# Patient Record
Sex: FEMALE | Race: WHITE | NOT HISPANIC OR LATINO | Employment: OTHER | URBAN - NONMETROPOLITAN AREA
[De-identification: names, ages, dates, MRNs, and addresses within clinical notes are randomized per-mention and may not be internally consistent; named-entity substitution may affect disease eponyms.]

---

## 2017-01-19 ENCOUNTER — AMBULATORY - GICH (OUTPATIENT)
Dept: SCHEDULING | Facility: OTHER | Age: 67
End: 2017-01-19

## 2017-03-03 ENCOUNTER — COMMUNICATION - GICH (OUTPATIENT)
Dept: FAMILY MEDICINE | Facility: OTHER | Age: 67
End: 2017-03-03

## 2017-03-03 DIAGNOSIS — I16.0 HYPERTENSIVE URGENCY: ICD-10-CM

## 2017-03-27 ENCOUNTER — OFFICE VISIT - GICH (OUTPATIENT)
Dept: FAMILY MEDICINE | Facility: OTHER | Age: 67
End: 2017-03-27

## 2017-03-27 ENCOUNTER — HISTORY (OUTPATIENT)
Dept: FAMILY MEDICINE | Facility: OTHER | Age: 67
End: 2017-03-27

## 2017-03-27 ENCOUNTER — AMBULATORY - GICH (OUTPATIENT)
Dept: LAB | Facility: OTHER | Age: 67
End: 2017-03-27

## 2017-03-27 DIAGNOSIS — E11.9 TYPE 2 DIABETES MELLITUS WITHOUT COMPLICATIONS (H): ICD-10-CM

## 2017-03-27 DIAGNOSIS — Z78.0 ASYMPTOMATIC MENOPAUSAL STATE: ICD-10-CM

## 2017-03-27 DIAGNOSIS — I10 ESSENTIAL (PRIMARY) HYPERTENSION: ICD-10-CM

## 2017-03-27 DIAGNOSIS — E78.5 HYPERLIPIDEMIA: ICD-10-CM

## 2017-03-27 DIAGNOSIS — Z12.11 ENCOUNTER FOR SCREENING FOR MALIGNANT NEOPLASM OF COLON: ICD-10-CM

## 2017-03-27 DIAGNOSIS — E61.1 IRON DEFICIENCY: ICD-10-CM

## 2017-03-27 LAB
A/G RATIO - HISTORICAL: 2.1 (ref 1–2)
ALBUMIN SERPL-MCNC: 4.1 G/DL (ref 3.5–5.7)
ALP SERPL-CCNC: 56 IU/L (ref 34–104)
ALT (SGPT) - HISTORICAL: 15 IU/L (ref 7–52)
ANION GAP - HISTORICAL: 8 (ref 5–18)
AST SERPL-CCNC: 14 IU/L (ref 13–39)
BILIRUB SERPL-MCNC: 0.4 MG/DL (ref 0.3–1)
BUN SERPL-MCNC: 15 MG/DL (ref 7–25)
BUN/CREAT RATIO - HISTORICAL: 19
CALCIUM SERPL-MCNC: 9.4 MG/DL (ref 8.6–10.3)
CHLORIDE SERPLBLD-SCNC: 106 MMOL/L (ref 98–107)
CO2 SERPL-SCNC: 28 MMOL/L (ref 21–31)
CREAT SERPL-MCNC: 0.81 MG/DL (ref 0.7–1.3)
ESTIMATED AVERAGE GLUCOSE: 148 MG/DL
GFR IF NOT AFRICAN AMERICAN - HISTORICAL: >60 ML/MIN/1.73M2
GLOBULIN - HISTORICAL: 2 G/DL (ref 2–3.7)
GLUCOSE SERPL-MCNC: 109 MG/DL (ref 70–105)
HEMOGLOBIN A1C MONITORING (POCT) - HISTORICAL: 6.8 % (ref 4–6.2)
POTASSIUM SERPL-SCNC: 3.8 MMOL/L (ref 3.5–5.1)
PROT SERPL-MCNC: 6.1 G/DL (ref 6.4–8.9)
SODIUM SERPL-SCNC: 142 MMOL/L (ref 133–143)

## 2017-03-30 ENCOUNTER — HOSPITAL ENCOUNTER (OUTPATIENT)
Dept: RADIOLOGY | Facility: OTHER | Age: 67
End: 2017-03-30
Attending: FAMILY MEDICINE

## 2017-03-30 DIAGNOSIS — Z78.0 ASYMPTOMATIC MENOPAUSAL STATE: ICD-10-CM

## 2017-04-03 ENCOUNTER — AMBULATORY - GICH (OUTPATIENT)
Dept: SCHEDULING | Facility: OTHER | Age: 67
End: 2017-04-03

## 2017-04-03 LAB — COLOGUARD-ABSTRACT: NEGATIVE

## 2017-04-17 ENCOUNTER — COMMUNICATION - GICH (OUTPATIENT)
Dept: FAMILY MEDICINE | Facility: OTHER | Age: 67
End: 2017-04-17

## 2017-06-16 ENCOUNTER — COMMUNICATION - GICH (OUTPATIENT)
Dept: FAMILY MEDICINE | Facility: OTHER | Age: 67
End: 2017-06-16

## 2017-06-16 DIAGNOSIS — E53.8 DEFICIENCY OF OTHER SPECIFIED B GROUP VITAMINS (CODE): ICD-10-CM

## 2017-06-19 ENCOUNTER — COMMUNICATION - GICH (OUTPATIENT)
Dept: FAMILY MEDICINE | Facility: OTHER | Age: 67
End: 2017-06-19

## 2017-06-19 DIAGNOSIS — E53.8 DEFICIENCY OF OTHER SPECIFIED B GROUP VITAMINS (CODE): ICD-10-CM

## 2017-06-21 ENCOUNTER — COMMUNICATION - GICH (OUTPATIENT)
Dept: FAMILY MEDICINE | Facility: OTHER | Age: 67
End: 2017-06-21

## 2017-06-27 ENCOUNTER — AMBULATORY - GICH (OUTPATIENT)
Dept: LAB | Facility: OTHER | Age: 67
End: 2017-06-27

## 2017-06-27 ENCOUNTER — HISTORY (OUTPATIENT)
Dept: FAMILY MEDICINE | Facility: OTHER | Age: 67
End: 2017-06-27

## 2017-06-27 ENCOUNTER — OFFICE VISIT - GICH (OUTPATIENT)
Dept: FAMILY MEDICINE | Facility: OTHER | Age: 67
End: 2017-06-27

## 2017-06-27 DIAGNOSIS — E11.9 TYPE 2 DIABETES MELLITUS WITHOUT COMPLICATIONS (H): ICD-10-CM

## 2017-06-27 DIAGNOSIS — E61.1 IRON DEFICIENCY: ICD-10-CM

## 2017-06-27 DIAGNOSIS — Z00.00 ENCOUNTER FOR GENERAL ADULT MEDICAL EXAMINATION WITHOUT ABNORMAL FINDINGS: ICD-10-CM

## 2017-06-27 DIAGNOSIS — E78.5 HYPERLIPIDEMIA: ICD-10-CM

## 2017-06-27 DIAGNOSIS — L60.3 NAIL DYSTROPHY: ICD-10-CM

## 2017-06-27 DIAGNOSIS — Z12.31 ENCOUNTER FOR SCREENING MAMMOGRAM FOR MALIGNANT NEOPLASM OF BREAST: ICD-10-CM

## 2017-06-27 DIAGNOSIS — I10 ESSENTIAL (PRIMARY) HYPERTENSION: ICD-10-CM

## 2017-06-27 DIAGNOSIS — Z23 ENCOUNTER FOR IMMUNIZATION: ICD-10-CM

## 2017-06-27 LAB
A/G RATIO - HISTORICAL: 1.6 (ref 1–2)
ABSOLUTE BASOPHILS - HISTORICAL: 0 THOU/CU MM
ABSOLUTE EOSINOPHILS - HISTORICAL: 0.2 THOU/CU MM
ABSOLUTE IMMATURE GRANULOCYTES(METAS,MYELOS,PROS) - HISTORICAL: 0 THOU/CU MM
ABSOLUTE LYMPHOCYTES - HISTORICAL: 2.4 THOU/CU MM (ref 0.9–2.9)
ABSOLUTE MONOCYTES - HISTORICAL: 0.5 THOU/CU MM
ABSOLUTE NEUTROPHILS - HISTORICAL: 3.7 THOU/CU MM (ref 1.7–7)
ALBUMIN SERPL-MCNC: 4 G/DL (ref 3.5–5.7)
ALP SERPL-CCNC: 53 IU/L (ref 34–104)
ALT (SGPT) - HISTORICAL: 15 IU/L (ref 7–52)
ANION GAP - HISTORICAL: 6 (ref 5–18)
AST SERPL-CCNC: 12 IU/L (ref 13–39)
BASOPHILS # BLD AUTO: 0.6 %
BILIRUB SERPL-MCNC: 0.4 MG/DL (ref 0.3–1)
BUN SERPL-MCNC: 16 MG/DL (ref 7–25)
BUN/CREAT RATIO - HISTORICAL: 16
CALCIUM SERPL-MCNC: 9.2 MG/DL (ref 8.6–10.3)
CHLORIDE SERPLBLD-SCNC: 107 MMOL/L (ref 98–107)
CHOL/HDL RATIO - HISTORICAL: 2.22
CHOLESTEROL TOTAL: 164 MG/DL
CO2 SERPL-SCNC: 28 MMOL/L (ref 21–31)
CREAT SERPL-MCNC: 1 MG/DL (ref 0.7–1.3)
EOSINOPHIL NFR BLD AUTO: 2.8 %
ERYTHROCYTE [DISTWIDTH] IN BLOOD BY AUTOMATED COUNT: 12.3 % (ref 11.5–15.5)
ESTIMATED AVERAGE GLUCOSE: 128 MG/DL
FERRITIN SERPL-MCNC: 5.7 NG/ML (ref 23.9–336.2)
GFR IF NOT AFRICAN AMERICAN - HISTORICAL: 55 ML/MIN/1.73M2
GLOBULIN - HISTORICAL: 2.5 G/DL (ref 2–3.7)
GLUCOSE SERPL-MCNC: 107 MG/DL (ref 70–105)
HCT VFR BLD AUTO: 39.5 % (ref 33–51)
HDLC SERPL-MCNC: 74 MG/DL (ref 23–92)
HEMOGLOBIN A1C MONITORING (POCT) - HISTORICAL: 6.1 % (ref 4–6.2)
HEMOGLOBIN: 13.2 G/DL (ref 12–16)
IMMATURE GRANULOCYTES(METAS,MYELOS,PROS) - HISTORICAL: 0.1 %
LDLC SERPL CALC-MCNC: 69 MG/DL
LYMPHOCYTES NFR BLD AUTO: 35.4 % (ref 20–44)
MCH RBC QN AUTO: 30.9 PG (ref 26–34)
MCHC RBC AUTO-ENTMCNC: 33.4 G/DL (ref 32–36)
MCV RBC AUTO: 93 FL (ref 80–100)
MONOCYTES NFR BLD AUTO: 7.4 %
NEUTROPHILS NFR BLD AUTO: 53.7 % (ref 42–72)
NON-HDL CHOLESTEROL - HISTORICAL: 90 MG/DL
PATIENT STATUS - HISTORICAL: NORMAL
PLATELET # BLD AUTO: 297 THOU/CU MM (ref 140–440)
PMV BLD: 9.8 FL (ref 6.5–11)
POTASSIUM SERPL-SCNC: 4.1 MMOL/L (ref 3.5–5.1)
PROT SERPL-MCNC: 6.5 G/DL (ref 6.4–8.9)
RED BLOOD COUNT - HISTORICAL: 4.27 MIL/CU MM (ref 4–5.2)
SODIUM SERPL-SCNC: 141 MMOL/L (ref 133–143)
TRIGL SERPL-MCNC: 104 MG/DL
TSH - HISTORICAL: 4.99 UIU/ML (ref 0.34–5.6)
WHITE BLOOD COUNT - HISTORICAL: 6.9 THOU/CU MM (ref 4.5–11)

## 2017-06-28 ENCOUNTER — AMBULATORY - GICH (OUTPATIENT)
Dept: FAMILY MEDICINE | Facility: OTHER | Age: 67
End: 2017-06-28

## 2017-07-07 ENCOUNTER — COMMUNICATION - GICH (OUTPATIENT)
Dept: FAMILY MEDICINE | Facility: OTHER | Age: 67
End: 2017-07-07

## 2017-07-07 DIAGNOSIS — E11.9 TYPE 2 DIABETES MELLITUS WITHOUT COMPLICATIONS (H): ICD-10-CM

## 2017-08-24 ENCOUNTER — COMMUNICATION - GICH (OUTPATIENT)
Dept: FAMILY MEDICINE | Facility: OTHER | Age: 67
End: 2017-08-24

## 2017-08-24 DIAGNOSIS — J30.1 ALLERGIC RHINITIS DUE TO POLLEN: ICD-10-CM

## 2017-09-29 ENCOUNTER — HOSPITAL ENCOUNTER (OUTPATIENT)
Dept: RADIOLOGY | Facility: OTHER | Age: 67
End: 2017-09-29
Attending: FAMILY MEDICINE

## 2017-09-29 ENCOUNTER — AMBULATORY - GICH (OUTPATIENT)
Dept: LAB | Facility: OTHER | Age: 67
End: 2017-09-29

## 2017-09-29 ENCOUNTER — OFFICE VISIT - GICH (OUTPATIENT)
Dept: FAMILY MEDICINE | Facility: OTHER | Age: 67
End: 2017-09-29

## 2017-09-29 ENCOUNTER — HISTORY (OUTPATIENT)
Dept: FAMILY MEDICINE | Facility: OTHER | Age: 67
End: 2017-09-29

## 2017-09-29 ENCOUNTER — HISTORY (OUTPATIENT)
Dept: RADIOLOGY | Facility: OTHER | Age: 67
End: 2017-09-29

## 2017-09-29 DIAGNOSIS — E78.5 HYPERLIPIDEMIA: ICD-10-CM

## 2017-09-29 DIAGNOSIS — E11.9 TYPE 2 DIABETES MELLITUS WITHOUT COMPLICATIONS (H): ICD-10-CM

## 2017-09-29 DIAGNOSIS — I10 ESSENTIAL (PRIMARY) HYPERTENSION: ICD-10-CM

## 2017-09-29 DIAGNOSIS — Z12.31 ENCOUNTER FOR SCREENING MAMMOGRAM FOR MALIGNANT NEOPLASM OF BREAST: ICD-10-CM

## 2017-09-29 DIAGNOSIS — Z23 ENCOUNTER FOR IMMUNIZATION: ICD-10-CM

## 2017-09-29 LAB
A/G RATIO - HISTORICAL: 1.5 (ref 1–2)
ALBUMIN SERPL-MCNC: 4 G/DL (ref 3.5–5.7)
ALP SERPL-CCNC: 54 IU/L (ref 34–104)
ALT (SGPT) - HISTORICAL: 21 IU/L (ref 7–52)
ANION GAP - HISTORICAL: 10 (ref 5–18)
AST SERPL-CCNC: 17 IU/L (ref 13–39)
BILIRUB SERPL-MCNC: 0.5 MG/DL (ref 0.3–1)
BUN SERPL-MCNC: 15 MG/DL (ref 7–25)
BUN/CREAT RATIO - HISTORICAL: 16
CALCIUM SERPL-MCNC: 9.5 MG/DL (ref 8.6–10.3)
CHLORIDE SERPLBLD-SCNC: 104 MMOL/L (ref 98–107)
CO2 SERPL-SCNC: 29 MMOL/L (ref 21–31)
CREAT SERPL-MCNC: 0.96 MG/DL (ref 0.7–1.3)
ESTIMATED AVERAGE GLUCOSE: 126 MG/DL
GFR IF NOT AFRICAN AMERICAN - HISTORICAL: 58 ML/MIN/1.73M2
GLOBULIN - HISTORICAL: 2.7 G/DL (ref 2–3.7)
GLUCOSE SERPL-MCNC: 110 MG/DL (ref 70–105)
HEMOGLOBIN A1C MONITORING (POCT) - HISTORICAL: 6 % (ref 4–6.2)
POTASSIUM SERPL-SCNC: 4.2 MMOL/L (ref 3.5–5.1)
PROT SERPL-MCNC: 6.7 G/DL (ref 6.4–8.9)
SODIUM SERPL-SCNC: 143 MMOL/L (ref 133–143)

## 2017-10-02 ENCOUNTER — AMBULATORY - GICH (OUTPATIENT)
Dept: RADIOLOGY | Facility: OTHER | Age: 67
End: 2017-10-02

## 2017-10-02 DIAGNOSIS — R92.8 OTHER ABNORMAL AND INCONCLUSIVE FINDINGS ON DIAGNOSTIC IMAGING OF BREAST: ICD-10-CM

## 2017-10-05 ENCOUNTER — COMMUNICATION - GICH (OUTPATIENT)
Dept: FAMILY MEDICINE | Facility: OTHER | Age: 67
End: 2017-10-05

## 2017-10-05 DIAGNOSIS — E11.9 TYPE 2 DIABETES MELLITUS WITHOUT COMPLICATIONS (H): ICD-10-CM

## 2017-10-06 ENCOUNTER — HOSPITAL ENCOUNTER (OUTPATIENT)
Dept: RADIOLOGY | Facility: OTHER | Age: 67
End: 2017-10-06
Attending: FAMILY MEDICINE

## 2017-10-06 ENCOUNTER — HISTORY (OUTPATIENT)
Dept: RADIOLOGY | Facility: OTHER | Age: 67
End: 2017-10-06

## 2017-10-06 DIAGNOSIS — R92.8 OTHER ABNORMAL AND INCONCLUSIVE FINDINGS ON DIAGNOSTIC IMAGING OF BREAST: ICD-10-CM

## 2017-11-26 ENCOUNTER — COMMUNICATION - GICH (OUTPATIENT)
Dept: FAMILY MEDICINE | Facility: OTHER | Age: 67
End: 2017-11-26

## 2017-11-26 DIAGNOSIS — E53.8 DEFICIENCY OF OTHER SPECIFIED B GROUP VITAMINS (CODE): ICD-10-CM

## 2017-12-09 ENCOUNTER — COMMUNICATION - GICH (OUTPATIENT)
Dept: FAMILY MEDICINE | Facility: OTHER | Age: 67
End: 2017-12-09

## 2017-12-09 DIAGNOSIS — E11.9 TYPE 2 DIABETES MELLITUS WITHOUT COMPLICATIONS (H): ICD-10-CM

## 2017-12-09 DIAGNOSIS — I10 ESSENTIAL (PRIMARY) HYPERTENSION: ICD-10-CM

## 2017-12-27 NOTE — PROGRESS NOTES
Patient Information     Patient Name MRN Sex Karen Conde [id number] Female 1950      Progress Notes by Luz Levin MD at 2017  8:30 AM     Author:  Luz Levin MD Service:  (none) Author Type:  Physician     Filed:  2017 12:30 PM Encounter Date:  2017 Status:  Signed     :  Luz Levin MD (Physician)            SUBJECTIVE:    Karen Vargas is a 67 y.o. female who presents for a diabetic check.    Diabetes   She presents for her initial diabetic visit. She has type 2 diabetes mellitus. MedicAlert identification noted. Her disease course has been stable. There are no hypoglycemic associated symptoms. Pertinent negatives for diabetes include no blurred vision, no chest pain, no fatigue, no foot paresthesias, no foot ulcerations, no polydipsia, no polyphagia, no polyuria, no visual change, no weakness and no weight loss. There are no hypoglycemic complications. Pertinent negatives for diabetic complications include no autonomic neuropathy, CVA, heart disease, impotence, nephropathy, peripheral neuropathy, PVD or retinopathy. Risk factors for coronary artery disease include diabetes mellitus, dyslipidemia, hypertension, obesity and post-menopausal. Current diabetic treatment includes oral agent (dual therapy). She is compliant with treatment most of the time. Her weight is stable. She is following a generally healthy diet. When asked about meal planning, she reported none. She has not had a previous visit with a dietitian. She participates in exercise daily. An ACE inhibitor/angiotensin II receptor blocker is being taken. She does not see a podiatrist.Eye exam is current (17).     I personally reviewed medications/allergies/history listed below:      Allergies     Allergen  Reactions     Ceclor [Cefaclor] Rash   ,   Family History       Problem   Relation Age of Onset     Cancer  Father      lung cancer ,        Heart Disease  Mother       "Coronary artery disease,        Cancer-colon  Mother      bladder cancer, colon cancer, metastatic to liver, colon cancer        Cancer-breast  Sister      metastatic breast cancer at age 63       Cancer  Sister      one  of uterine cancer age 48        Cancer  Sister      one  of bone cancer age 46.       Cancer  Brother      had brain tumors, ultimately committed suicide        Cancer  Sister      lung cancer     ,   Current Outpatient Prescriptions on File Prior to Visit       Medication  Sig Dispense Refill     aspirin enteric coated 81 mg tablet Take 1 tablet by mouth once daily with a meal.  0     blood sugar diagnostic (ACCU-CHEK DEE PLUS TEST STRP) strip Dispense test strips covered by the patient insurance. Test 2 time per day.  ICD E11.9 200 Strip 4     Blood-Glucose Meter (ACCU-CHEK DEE PLUS METER) Dispense glucose meter, test strips and lancets covered by the patient insurance. Test 1 time per day.  Diagnosis 250.0. 1 Device 0     blood-glucose meter Accu chek Dee Plus meter.  E11.9 NIDDM type II - Test 1 time/day. 1 Device 0     cyanocobalamin (VITAMIN B12) 1,000 mcg/mL injection INJECT 1ML INTRAMUSCULAR EVERY 4 WEEKS. 3 mL 3     ferrous gluconate 325 mg (37 mg iron) tablet Take 1 tablet by mouth once daily with a meal.  0     glucosamine sulfate 1,000 mg cap Take 2 capsules by mouth once daily.  0     Insulin Needles, Disposable, (BD INSULIN PEN NEEDLE UF MINI) 31 gauge x 3/16\" Test blood sugars twice daily ICD E11.9. 200 Each 3     lancets (ACCU-CHEK SOFTCLIX LANCETS) Test 1 times per day.  Diagnosis 250.0. 100 Each 3     liraglutide (VICTOZA 3-JOSE MIGUEL) 0.6 mg/0.1 mL (18 mg/3 mL) injection Start with 0.6 mg subcutaneous daily x 1 week, then increase to 1.2 mg subcutaneous daily. 3 mL 11     lisinopril (PRINIVIL; ZESTRIL) 2.5 mg tablet Take 1 tablet by mouth once daily. 90 tablet 3     loratadine (CLARITIN) 10 mg tablet TAKE ONE TABLET BY MOUTH ONE TIME DAILY 90 tablet 2     metFORMIN " (GLUCOPHAGE) 1,000 mg tablet Take 1 tablet by mouth 2 times daily with meals. 180 tablet 3     multivitamin (MVI) tablet Take 1 tablet by mouth once daily.  0     omega-3 fatty acids-vitamin E (FISH OIL) 1,000 mg cap Take 1 capsule by mouth once daily.  0     ONE TOUCH ULTRA TEST strip TEST TWICE DAILY 200 Each 3     triamterene-hydrochlorothiazide, 37.5-25 mg, (MAXZIDE-25) 37.5-25 mg tablet TAKE HALF TABLET BY MOUTH DAILY 45 tablet 3     No current facility-administered medications on file prior to visit.    ,   Past Medical History:     Diagnosis  Date     Basal cell carcinoma of nose 2/25/2015     Diabetes mellitus (HC)      History of pregnancy     G2, P2-0-0-2      Intentional weight loss     History of morbid obesity, weight loss on byetta    ,   Patient Active Problem List     Diagnosis  Code     ALLERGIC RHINITIS, SEASONAL J30.1     DIABETES MELLITUS, TYPE II E11.9     HYPERTENSION I10     HYPERLIPIDEMIA E78.5     OVER WEIGHT E66.9     HIP PAIN, RIGH,T SI JOINT DYSFUNCTION M25.559    and   Past Surgical History:      Procedure  Laterality Date     CARPAL TUNNEL RELEASE  11/2000, 12/2000    Laparoscopic carpal tunnel release right hand, left hand       CATARACT EXTRACTION W/  INTRAOCULAR LENS IMPLANT Left 6/15/16     COLONOSCOPY  12/6/2006    10 year f/u        GASTRIC BYPASS      Silvestre-en-Y gastric bypass with incidental cholecystectomy and umbilical hernia repair       HERNIA REPAIR       umbilical hernia repair       KNEE ARTHROSCOPY Left 2008     LAP CHOLECYSTECTOMY       REFRACTIVE SURGERY  07/2000    LASIK refractive eye surgery 7/00       Social History     Social History        Marital status:       Spouse name: N/A     Number of children:  N/A     Years of education:  N/A     Occupational History      Not on file.     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     - Georgi - metastatic pancreatic cancer,  2012.    Peña 72 son    Tsering 10/4/74 daughter    grandson - Barry             REVIEW OF SYSTEMS:  Review of Systems   Constitutional: Negative for fatigue and weight loss.   Eyes: Negative for blurred vision.   Cardiovascular: Negative for chest pain.   Genitourinary: Negative for impotence.   Neurological: Negative for weakness.   Endo/Heme/Allergies: Negative for polydipsia and polyphagia.   Psychiatric/Behavioral: Negative for depression.   All other systems reviewed and are negative.      OBJECTIVE:  /76  Pulse 63  Temp 97.3  F (36.3  C) (Tympanic)  Wt 78.7 kg (173 lb 6.4 oz)  Breastfeeding? No  BMI 32.53 kg/m2    EXAM:   Physical Exam   Constitutional: She is well-developed, well-nourished, and in no distress. No distress.   HENT:   Head: Normocephalic and atraumatic.   Eyes: Pupils are equal, round, and reactive to light.   Neck: Normal range of motion. Neck supple. No thyromegaly present.   Cardiovascular: Normal rate, regular rhythm, normal heart sounds and intact distal pulses.  Exam reveals no gallop and no friction rub.    No murmur heard.  Pulmonary/Chest: Effort normal and breath sounds normal. No respiratory distress. She has no wheezes. She has no rales.   Lymphadenopathy:     She has no cervical adenopathy.   Neurological:   Normal monofilament exam in feet bilaterally.   Skin: Skin is warm and dry.   No lesions on feet.   Psychiatric:   PHQ Depression Screen  Date of PHQ exam: 17  Over the last 2 weeks, how often have you been bothered by any of the  following problems?  1. Little interest or pleasure in doing things: 0 - Not at all  2. Feeling down, depressed, or hopeless: 0 - Not at all                                              I personally reviewed results with patient as listed below:     Results for orders placed or performed in visit on 09/29/17      Hgb A1c      Result  Value Ref Range    HEMOGLOBIN A1C MONITORING (POCT) 6.0 4.0 - 6.2 %    ESTIMATED AVERAGE GLUCOSE  126 mg/dL   COMP METABOLIC PANEL      Result  Value Ref Range    SODIUM 143 133 - 143 mmol/L    POTASSIUM 4.2 3.5 - 5.1 mmol/L    CHLORIDE 104 98 - 107 mmol/L    CO2,TOTAL 29 21 - 31 mmol/L    ANION GAP 10 5 - 18                    GLUCOSE 110 (H) 70 - 105 mg/dL    CALCIUM 9.5 8.6 - 10.3 mg/dL    BUN 15 7 - 25 mg/dL    CREATININE 0.96 0.70 - 1.30 mg/dL    BUN/CREAT RATIO           16                    GFR if African American >60 >60 ml/min/1.73m2    GFR if not African American 58 (L) >60 ml/min/1.73m2    ALBUMIN 4.0 3.5 - 5.7 g/dL    PROTEIN,TOTAL 6.7 6.4 - 8.9 g/dL    GLOBULIN                  2.7 2.0 - 3.7 g/dL    A/G RATIO 1.5 1.0 - 2.0                    BILIRUBIN,TOTAL 0.5 0.3 - 1.0 mg/dL    ALK PHOSPHATASE 54 34 - 104 IU/L    ALT (SGPT) 21 7 - 52 IU/L    AST (SGOT) 17 13 - 39 IU/L        ASSESSMENT/PLAN:    ICD-10-CM    1. DIABETES MELLITUS, TYPE II E11.9    2. HYPERTENSION I10    3. Hyperlipidemia, unspecified hyperlipidemia type E78.5    4. Needs flu shot Z23 FLU VACCINE => 65 YRS HIGH DOSE TRIVALENT IIV3 IM      SC ADMIN VACC INITIAL        Plan:    1.  Stable.  A1c in good range.  Blood pressure is well controlled. Eye exam is up to date.  On aspirin daily.  Non-smoker.  On ACE-I. she has been on a statin, but would like to get off of it. Her last lipid profile was in very good range. She will go off of this with the time being and recheck lipids again in 6 months. Since her A1c is so well controlled, could also consider trying to go off of the Victoza. She is going to use at the  supply that she has and then try going off of it. She anticipates about 2 months more of taking this.  2. Blood pressures also very well controlled. She is going to try going off of metoprolol and will monitor blood pressures to see how they run. If rebounding up above 140/90 we'll need to restart again.  3. See #1.  4. Flu shot updated today.    Patient Instructions   You may try going off of simvastatin and metoprolol.    Once you have run out of the victoza, you may try going off of that as well.     Luz Levin MD

## 2017-12-27 NOTE — PROGRESS NOTES
Patient Information     Patient Name MRN Sex Karen Conde [id number] Female 1950      Progress Notes by Jeannine Alex R.T. ([facility]) at 10/6/2017 11:24 AM     Author:  [facility]ann[facility]T. [facility] Service:  (none) Author Type:  [employer] - Registered Radiologic Technologist     Filed:  10/6/2017 11:24 AM Date of Service:  10/6/2017 11:24 AM Status:  Signed     :  Jeannine Alex R.T. (ARRT) ([facility] - Registered Radiologic Technologist)            Falls Risk Criteria:    Age 65 and older or under age 4        Sensory deficits    Poor vision    Use of ambulatory aides    Impaired judgment    Unable to walk independently    Meets High Risk criteria for falls:  Yes               1.  Do you have dizziness or vertigo?    no                    2.  Do you need help standing or walking?   no                 3.  Have you fallen within the last 6 months?    no           4.  Has the patient been fasting?      no       If any risks are marked Yes, the following interventions are utilized:    Do not leave patient unattended     Assist patient in the dressing room and bathroom    Have ambulatory aides available throughout procedure    Involve patient s family if available

## 2017-12-28 NOTE — TELEPHONE ENCOUNTER
Patient Information     Patient Name MRN Sex Karen Conde [id number] Female 1950      Telephone Encounter by Luz Levin MD at 2017 12:11 PM     Author:  Luz Levin MD Service:  (none) Author Type:  Physician     Filed:  2017 12:12 PM Encounter Date:  2017 Status:  Signed     :  Luz Levin MD (Physician)            Please arrange to have records sent.  Luz Levin MD ....................  2017   12:11 PM

## 2017-12-28 NOTE — PROGRESS NOTES
Patient Information     Patient Name MRN Sex Karen Conde [id number] Female 1950      Progress Notes by Harleen Wakefield at 10/6/2017 10:18 AM     Author:  Harleen Wakefield Service:  (none) Author Type:  (none)     Filed:  10/6/2017 10:18 AM Date of Service:  10/6/2017 10:18 AM Status:  Signed     :  Harleen Wakefield            Falls Risk Criteria:    Age 65 and older or under age 4        Sensory deficits    Poor vision    Use of ambulatory aides    Impaired judgment    Unable to walk independently    Meets High Risk criteria for falls:  Yes               1.  Do you have dizziness or vertigo?    no                    2.  Do you need help standing or walking?   no                 3.  Have you fallen within the last 6 months?    no           4.  Has the patient been fasting?      No                 If any risks are marked Yes, the following interventions are utilized:    Do not leave patient unattended     Assist patient in the dressing room and bathroom    Have ambulatory aides available throughout procedure    Involve patient s family if available

## 2017-12-28 NOTE — TELEPHONE ENCOUNTER
Patient Information     Patient Name MRN Sex Karen Conde [id number] Female 1950      Telephone Encounter by Kendra Romero RN at 7/10/2017  2:51 PM     Author:  Kendra Romero RN Service:  (none) Author Type:  NURS- Registered Nurse     Filed:  7/10/2017  2:53 PM Encounter Date:  2017 Status:  Signed     :  Kendra Romero RN (NURS- Registered Nurse)            Refill request inappropriate. Filled 17 180 x 3 . Pharmacy alerted. Unable to complete prescription refill per RN Medication Refill Policy.................... Kendra Romero RN ....................  7/10/2017   2:52 PM    Prescribing Provider: Luz Marti MD            Order Date: 2017  Ordered by: LUZ MARTI  Medication:metFORMIN (GLUCOPHAGE) 1,000 mg tablet    Qty:180 tablet  Ref:3  Start:2017   End:              Route:Oral                  CHANTEL:No   Class:eRx    Sig:Take 1 tablet by mouth 2 times daily with meals.    Pharmacy:[facility] [id number] [place] [facility] [place], [place] [address]

## 2017-12-28 NOTE — TELEPHONE ENCOUNTER
Patient Information     Patient Name MRN Sex Karen Conde [id number] Female 1950      Telephone Encounter by Kendra Romero RN at 2017 11:49 AM     Author:  Kendra Romero RN Service:  (none) Author Type:  NURS- Registered Nurse     Filed:  2017 11:54 AM Encounter Date:  2017 Status:  Signed     :  Kendra Romero RN (NURS- Registered Nurse)            Office visit in the past 12 months or per provider note.  Last visit with JAIME MARTI was on: 2017 in [facility]  Next visit with JAIME MARTI is on: 2017 in [facility]  Max refill for 12 months from last office visit or per provider note.  Prescription refilled per RN Medication Refill Policy.................... Kendra Romero RN ....................  2017   11:54 AM

## 2017-12-28 NOTE — PROGRESS NOTES
Patient Information     Patient Name MRN Sex Karen Conde [id number] Female 1950      Progress Notes by Raya Chaney at 2017  9:34 AM     Author:  Raya Chaney Service:  (none) Author Type:  Other Clinical Staff     Filed:  2017  9:34 AM Date of Service:  2017  9:34 AM Status:  Signed     :  Raya Chaney (Other Clinical Staff)            Falls Risk Criteria:    Age 65 and older or under age 4        Sensory deficits    Poor vision    Use of ambulatory aides    Impaired judgment    Unable to walk independently    Meets High Risk criteria for falls:  Yes             1.  Do you have dizziness or vertigo?    no                    2.  Do you need help standing or walking?   no                 3.  Have you fallen within the last 6 months?    no           4.  Has the patient been fasting?      no       If any risks are marked Yes, the following interventions are utilized:    Do not leave patient unattended     Assist patient in the dressing room and bathroom    Have ambulatory aides available throughout procedure    Involve patient s family if available

## 2017-12-28 NOTE — TELEPHONE ENCOUNTER
Patient Information     Patient Name MRN Karen Bruce [id number] Female 1950      Telephone Encounter by Katie Grimaldo at 2017  8:21 AM     Author:  Katie Grimaldo Service:  (none) Author Type:  (none)     Filed:  2017  8:22 AM Encounter Date:  2017 Status:  Signed     :  Katie Grimaldo            This patient is coming in for lab on 17. Her orders will  on 17, could you please extend those so they will not be  when she comes?  Thank you

## 2017-12-28 NOTE — PATIENT INSTRUCTIONS
Patient Information     Patient Name MRN Karen Bruce [id number] Female 1950      Patient Instructions by Luz Levin MD at 2017  8:30 AM     Author:  Luz Levin MD Service:  (none) Author Type:  Physician     Filed:  2017  9:05 AM Encounter Date:  2017 Status:  Signed     :  Luz Levin MD (Physician)            You may try going off of simvastatin and metoprolol.    Once you have run out of the victoza, you may try going off of that as well.

## 2017-12-28 NOTE — TELEPHONE ENCOUNTER
Patient Information     Patient Name MRN Sex Karen Conde [id number] Female 1950      Telephone Encounter by Kendra Romero RN at 2017  8:49 AM     Author:  Kendra Romero RN Service:  (none) Author Type:  NURS- Registered Nurse     Filed:  2017  8:55 AM Encounter Date:  2017 Status:  Signed     :  Kendra Romero RN (NURS- Registered Nurse)            Antihistamines:  Office visit in the past 12 months.  Last visit with JAIME MARTI was on: 2017 in [facility]  Next visit with JAIME MARTI is on: 2017 in [facility]  Next visit with Family Practice is on: 2017 in [facility]  Max refills 12 months from last office visit.  Prescription refilled per RN Medication Refill Policy.................... Kendra Romero RN ....................  2017   8:51 AM

## 2017-12-28 NOTE — TELEPHONE ENCOUNTER
Patient Information     Patient Name MRN Sex Karen Conde [id number] Female 1950      Telephone Encounter by Kendra Romero RN at 10/6/2017 11:44 AM     Author:  Kendra Romero RN Service:  (none) Author Type:  NURS- Registered Nurse     Filed:  10/6/2017 11:46 AM Encounter Date:  10/5/2017 Status:  Signed     :  Kendra Romero RN (NURS- Registered Nurse)            Filled 17 #180 x 3. Pharmacy alerted. Unable to complete prescription refill per RN Medication Refill Policy.................... Kendra Romero RN ....................  10/6/2017   11:45 AM    Prescribing Provider: Luz Marti MD             Order Date: 2017  Ordered by: LUZ MARTI  Medication:metFORMIN (GLUCOPHAGE) 1,000 mg tablet    Qty:180 tablet  Ref:3  Start:2017   End:              Route:Oral                  CHANTEL:No   Class:eRx    Sig:Take 1 tablet by mouth 2 times daily with meals.    Pharmacy:[facility] [id number] [place] [facility] [place] [address]

## 2017-12-28 NOTE — TELEPHONE ENCOUNTER
Patient Information     Patient Name MRN Sex Karen Conde [id number] Female 1950      Telephone Encounter by Kendra Romero RN at 2017 12:25 PM     Author:  Kendra Romero RN Service:  (none) Author Type:  NURS- Registered Nurse     Filed:  2017 12:30 PM Encounter Date:  2017 Status:  Signed     :  Kendra Romero RN (NURS- Registered Nurse)            Refill inappropriate. Filled 16 3 ml x 3. Due 17. Pharmacy alerted. Unable to complete prescription refill per RN Medication Refill Policy.................... Kendra Romero RN ....................  2017   12:29 PM    Prescribing Provider: Luz Marti MD             Order Date: 2016  Ordered by: GEENA GRIJALVA  Medication:cyanocobalamin (VITAMIN B12) 1,000 mcg/mL injection  Prescription #:[id number]    Qty:3 mL        Ref:3  Start:2016   End:              Route:Intra-Muscular        CHANTEL:No   Class:eRx    Sig:INJECT 1MLINTRAMUSCULAR EVERY 4 WEEKS.    Pharmacy:[facility] [id number] [place] [address]    Cosign accepted by LUZ MARTI[[id number]] on 2016  8:17 AM

## 2017-12-28 NOTE — TELEPHONE ENCOUNTER
Patient Information     Patient Name MRN Sex Karen Conde [id number] Female 1950      Telephone Encounter by Luz Levin MD at 2017 12:45 PM     Author:  Luz Levin MD Service:  (none) Author Type:  Physician     Filed:  2017 12:45 PM Encounter Date:  2017 Status:  Signed     :  Luz Levin MD (Physician)            Labs extended.  Luz Levin MD ....................  2017   12:45 PM

## 2017-12-28 NOTE — PROGRESS NOTES
Patient Information     Patient Name MRN Sex Karen Conde [id number] Female 1950      Progress Notes by Luz Levin MD at 2017  8:15 AM     Author:  Luz Levin MD Service:  (none) Author Type:  Physician     Filed:  2017 10:00 AM Encounter Date:  2017 Status:  Signed     :  Luz Levin MD (Physician)            SUBJECTIVE:    Karen Vargas is a 66 y.o. female who presents for a diabetic check and follow up of chronic health issues.    Diabetes   She presents for her follow-up diabetic visit. She has type 2 diabetes mellitus. No MedicAlert identification noted. Her disease course has been stable. There are no hypoglycemic associated symptoms. Associated symptoms include weight loss. Pertinent negatives for diabetes include no chest pain. There are no hypoglycemic complications. There are no diabetic complications. Risk factors for coronary artery disease include diabetes mellitus, dyslipidemia, hypertension, obesity and post-menopausal. Current diabetic treatment includes diet and oral agent (dual therapy). She is compliant with treatment most of the time. Her weight is decreasing steadily. She is following a generally healthy diet. When asked about meal planning, she reported none. She has not had a previous visit with a dietitian. Exercise: walking at least 5 days per week. Her home blood glucose trend is decreasing steadily. (Fastin-99.) An ACE inhibitor/angiotensin II receptor blocker is being taken. She does not see a podiatrist.Eye exam is current (17).     I personally reviewed medications/allergies/history listed below:      Allergies     Allergen  Reactions     Ceclor [Cefaclor] Rash   ,   Family History       Problem   Relation Age of Onset     Cancer  Father      lung cancer ,        Heart Disease  Mother      Coronary artery disease,        Cancer-colon  Mother      bladder cancer, colon cancer, metastatic to  "liver, colon cancer        Cancer-breast  Sister      metastatic breast cancer at age 63       Cancer  Sister      one  of uterine cancer age 48        Cancer  Sister      one  of bone cancer age 46.       Cancer  Brother      had brain tumors, ultimately committed suicide        Cancer  Sister      lung cancer     ,   Current Outpatient Prescriptions on File Prior to Visit       Medication  Sig Dispense Refill     aspirin enteric coated 81 mg tablet Take 1 tablet by mouth once daily with a meal.  0     blood sugar diagnostic (ACCU-CHEK DEE PLUS TEST STRP) strip Dispense test strips covered by the patient insurance. Test 2 time per day.  ICD E11.9 200 Strip 4     Blood-Glucose Meter (ACCU-CHEK DEE PLUS METER) Dispense glucose meter, test strips and lancets covered by the patient insurance. Test 1 time per day.  Diagnosis 250.0. 1 Device 0     blood-glucose meter Accu chek Dee Plus meter.  E11.9 NIDDM type II - Test 1 time/day. 1 Device 0     cyanocobalamin (VITAMIN B12) 1,000 mcg/mL injection INJECT 1ML INTRAMUSCULAR EVERY 4 WEEKS. 3 mL 3     ferrous gluconate 325 mg (37 mg iron) tablet Take 1 tablet by mouth once daily with a meal.  0     glucosamine sulfate 1,000 mg cap Take 2 capsules by mouth once daily.  0     Insulin Needles, Disposable, (BD INSULIN PEN NEEDLE UF MINI) 31 gauge x 3/16\" Test blood sugars twice daily ICD E11.9. 200 Each 3     lancets (ACCU-CHEK SOFTCLIX LANCETS) Test 1 times per day.  Diagnosis 250.0. 100 Each 3     liraglutide (VICTOZA 3-JOSE MIGUEL) 0.6 mg/0.1 mL (18 mg/3 mL) injection Start with 0.6 mg subcutaneous daily x 1 week, then increase to 1.2 mg subcutaneous daily. 3 mL 11     lisinopril (PRINIVIL; ZESTRIL) 2.5 mg tablet Take 1 tablet by mouth once daily. 90 tablet 3     loratadine (CLARITIN) 10 mg tablet TAKE ONE TABLET BY MOUTH ONE TIME DAILY 90 tablet 3     multivitamin (MVI) tablet Take 1 tablet by mouth once daily.  0     omega-3 fatty acids-vitamin E (FISH OIL) 1,000 mg " cap Take 1 capsule by mouth once daily.  0     ONE TOUCH ULTRA TEST strip TEST TWICE DAILY 200 Each 3     triamterene-hydrochlorothiazide, 37.5-25 mg, (MAXZIDE-25) 37.5-25 mg tablet TAKE HALF TABLET BY MOUTH DAILY 45 tablet 3     No current facility-administered medications on file prior to visit.    ,   Past Medical History:     Diagnosis  Date     Basal cell carcinoma of nose 2/25/2015     Diabetes mellitus (HC)      History of pregnancy     G2, P2-0-0-2      Intentional weight loss     History of morbid obesity, weight loss on byetta    ,   Patient Active Problem List     Diagnosis  Code     ALLERGIC RHINITIS, SEASONAL J30.1     DIABETES MELLITUS, TYPE II E11.9     HYPERTENSION I10     HYPERLIPIDEMIA E78.5     OVER WEIGHT E66.9     HIP PAIN, RIGH,T SI JOINT DYSFUNCTION M25.559    and   Past Surgical History:      Procedure  Laterality Date     CARPAL TUNNEL RELEASE  11/2000, 12/2000    Laparoscopic carpal tunnel release right hand, left hand       CATARACT EXTRACTION W/  INTRAOCULAR LENS IMPLANT Left 6/15/16     COLONOSCOPY  12/6/2006    10 year f/u        GASTRIC BYPASS      Silvestre-en-Y gastric bypass with incidental cholecystectomy and umbilical hernia repair       HERNIA REPAIR       umbilical hernia repair       KNEE ARTHROSCOPY Left 2008     LAP CHOLECYSTECTOMY       REFRACTIVE SURGERY  07/2000    LASIK refractive eye surgery 7/00       Social History     Social History        Marital status:       Spouse name: N/A     Number of children:  N/A     Years of education:  N/A     Occupational History      Not on file.     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     - Georgi - metastatic pancreatic cancer,  2012.    Peña 72 son    Tsering 10/4/74 daughter    grandson - Barry             REVIEW OF SYSTEMS:  Review of Systems   Constitutional: Positive for weight loss. Negative for chills and fever.   Cardiovascular: Negative for chest pain and leg swelling.   Skin:        Left great toenail is onychomycotic.  It gets very thick, causes pain at times.  She would like to have it permanently removed if possible.   Psychiatric/Behavioral: Negative for depression.   All other systems reviewed and are negative.      OBJECTIVE:  /78  Temp 97.3  F (36.3  C) (Tympanic)  Ht 1.555 m (5' 1.22\")  Wt 77.8 kg (171 lb 9.6 oz)  Breastfeeding? No  BMI 32.19 kg/m2    EXAM:   Physical Exam   Constitutional: She is oriented to person, place, and time and well-developed, well-nourished, and in no distress. No distress.   HENT:   Head: Normocephalic and atraumatic.   Right Ear: External ear normal.   Left Ear: External ear normal.   Nose: Nose normal.   Mouth/Throat: Oropharynx is clear and moist.   Eyes: Pupils are equal, round, and reactive to light.   Neck: Normal range of motion. Neck supple. No thyromegaly present.   Cardiovascular: Normal rate, regular rhythm, normal heart sounds and intact distal pulses.  Exam reveals no gallop and no friction rub.    No murmur heard.  Pulmonary/Chest: Effort normal and breath sounds normal. No respiratory distress. She has no wheezes. She has no rales.   Breast exam: No masses palpable. No skin changes, tethering, axillary lymphadenopathy bilaterally.   Abdominal: Soft. Bowel sounds are normal. She exhibits no distension and no mass. There is no tenderness. There is no rebound and no guarding.   Genitourinary:   Genitourinary Comments: Pelvic/rectal exam deferred per " patient.   Musculoskeletal: Normal range of motion. She exhibits no edema.   Lymphadenopathy:     She has no cervical adenopathy.   Neurological: She is alert and oriented to person, place, and time. No cranial nerve deficit.   Normal monofilament exam in feet bilaterally.   Skin: Skin is warm and dry.   No lesions on feet.  Left great toenail thickened and dystrophic.   Psychiatric: Affect normal.   PHQ Depression Screen  Date of PHQ exam: 06/27/17  Over the last 2 weeks, how often have you been bothered by any of the following problems?  1. Little interest or pleasure in doing things: 0 - Not at all  2. Feeling down, depressed, or hopeless: 0 - Not at all                                             I personally reviewed results with patient as listed below:     Results for orders placed or performed in visit on 06/27/17      FERRITIN      Result  Value Ref Range    FERRITIN 5.7 (L) 23.9 - 336.2 ng/mL   Hgb A1c      Result  Value Ref Range    HEMOGLOBIN A1C MONITORING (POCT) 6.1 4.0 - 6.2 %    ESTIMATED AVERAGE GLUCOSE  128 mg/dL   COMP METABOLIC PANEL      Result  Value Ref Range    SODIUM 141 133 - 143 mmol/L    POTASSIUM 4.1 3.5 - 5.1 mmol/L    CHLORIDE 107 98 - 107 mmol/L    CO2,TOTAL 28 21 - 31 mmol/L    ANION GAP 6 5 - 18                    GLUCOSE 107 (H) 70 - 105 mg/dL    CALCIUM 9.2 8.6 - 10.3 mg/dL    BUN 16 7 - 25 mg/dL    CREATININE 1.00 0.70 - 1.30 mg/dL    BUN/CREAT RATIO           16                    GFR if African American >60 >60 ml/min/1.73m2    GFR if not African American 55 (L) >60 ml/min/1.73m2    ALBUMIN 4.0 3.5 - 5.7 g/dL    PROTEIN,TOTAL 6.5 6.4 - 8.9 g/dL    GLOBULIN                  2.5 2.0 - 3.7 g/dL    A/G RATIO 1.6 1.0 - 2.0                    BILIRUBIN,TOTAL 0.4 0.3 - 1.0 mg/dL    ALK PHOSPHATASE 53 34 - 104 IU/L    ALT (SGPT) 15 7 - 52 IU/L    AST (SGOT) 12 (L) 13 - 39 IU/L   LIPID PANEL      Result  Value Ref Range    CHOLESTEROL,TOTAL 164 <200 mg/dL    TRIGLYCERIDES 104 <150 mg/dL     HDL CHOLESTEROL 74 23 - 92 mg/dL    NON-HDL CHOLESTEROL 90 <145 mg/dl    CHOL/HDL RATIO            2.22 <4.50                    LDL CHOLESTEROL 69 <100 mg/dL    PATIENT STATUS            FASTING                   TSH      Result  Value Ref Range    TSH 4.99 0.34 - 5.60 uIU/mL   CBC WITH AUTO DIFFERENTIAL      Result  Value Ref Range    WHITE BLOOD COUNT         6.9 4.5 - 11.0 thou/cu mm    RED BLOOD COUNT           4.27 4.00 - 5.20 mil/cu mm    HEMOGLOBIN                13.2 12.0 - 16.0 g/dL    HEMATOCRIT                39.5 33.0 - 51.0 %    MCV                       93 80 - 100 fL    MCH                       30.9 26.0 - 34.0 pg    MCHC                      33.4 32.0 - 36.0 g/dL    RDW                       12.3 11.5 - 15.5 %    PLATELET COUNT            297 140 - 440 thou/cu mm    MPV                       9.8 6.5 - 11.0 fL    NEUTROPHILS               53.7 42.0 - 72.0 %    LYMPHOCYTES               35.4 20.0 - 44.0 %    MONOCYTES                 7.4 <12.0 %    EOSINOPHILS               2.8 <8.0 %    BASOPHILS                 0.6 <3.0 %    IMMATURE GRANULOCYTES(METAS,MYELOS,PROS) 0.1 %    ABSOLUTE NEUTROPHILS      3.7 1.7 - 7.0 thou/cu mm    ABSOLUTE LYMPHOCYTES      2.4 0.9 - 2.9 thou/cu mm    ABSOLUTE MONOCYTES        0.5 <0.9 thou/cu mm    ABSOLUTE EOSINOPHILS      0.2 <0.5 thou/cu mm    ABSOLUTE BASOPHILS        0.0 <0.3 thou/cu mm    ABSOLUTE IMMATURE GRANULOCYTES(METAS,MYELOS,PROS) 0.0 <=0.3 thou/cu mm        ASSESSMENT/PLAN:    ICD-10-CM    1. DIABETES MELLITUS, TYPE II E11.9 metFORMIN (GLUCOPHAGE) 1,000 mg tablet   2. HYPERTENSION I10 metoprolol tartrate (LOPRESSOR) 50 mg tablet   3. Hyperlipidemia, unspecified hyperlipidemia type E78.5 simvastatin (ZOCOR) 10 mg tablet   4. Encounter for screening mammogram for high-risk patient Z12.31 XR MAMMO BILAT SCREENING   5. Need for pneumococcal vaccination Z23 OMNI PREVNAR 13 (AKA PNEUMOCOCCAL VACCINE 13-VALENT IM)   6. Dystrophic nail L60.3 AMB CONSULT TO  PODIATRY/ANKLE AND FOOT SURGERY    Left great toenail     7. Health care maintenance Z00.00         Plan:    1.  Stable.  A1c in good range.  Blood pressure is well controlled. Eye exam is up to date.  On aspirin daily.  Non-smoker.  On ACE-I and statin.  Follow up in 3 months.  2. Stable.  Blood pressure well controlled.  3.  Stable.  Continue on current medications.  4.  Mammogram ordered to be done after 9/28/17.  5.  Prevnar updated today.  6.  Referred to podiatry for consideration of nail removal.  7.  Mammogram as above.  cologuard in 4/2017 was normal.  DEXA a year ago was normal.  Pap smear deferred due to age.  Other vaccines are up to date.  Luz Levin MD ....................  6/27/2017   9:15 AM

## 2017-12-28 NOTE — TELEPHONE ENCOUNTER
Patient Information     Patient Name MRN Sex Karen Conde [id number] Female 1950      Telephone Encounter by Kendra Romero RN at 2017  1:04 PM     Author:  Kendra Romero RN Service:  (none) Author Type:  NURS- Registered Nurse     Filed:  2017  1:07 PM Encounter Date:  2017 Status:  Signed     :  Kendra Romero RN (NURS- Registered Nurse)            Office visit in the past 12 months or per provider note.  Last visit with JAIME MARTI was on: 2017 in [facility]  Next visit with JAIME MARTI is on: 2017 in [facility]  Max refill for 12 months from last office visit or per provider note.  Prescription refilled per RN Medication Refill Policy.................... Kendra Romero RN ....................  2017   1:06 PM

## 2017-12-29 ENCOUNTER — HISTORY (OUTPATIENT)
Dept: FAMILY MEDICINE | Facility: OTHER | Age: 67
End: 2017-12-29

## 2017-12-29 ENCOUNTER — OFFICE VISIT - GICH (OUTPATIENT)
Dept: FAMILY MEDICINE | Facility: OTHER | Age: 67
End: 2017-12-29

## 2017-12-29 ENCOUNTER — AMBULATORY - GICH (OUTPATIENT)
Dept: FAMILY MEDICINE | Facility: OTHER | Age: 67
End: 2017-12-29

## 2017-12-29 ENCOUNTER — AMBULATORY - GICH (OUTPATIENT)
Dept: LAB | Facility: OTHER | Age: 67
End: 2017-12-29

## 2017-12-29 DIAGNOSIS — E11.9 TYPE 2 DIABETES MELLITUS WITHOUT COMPLICATIONS (H): ICD-10-CM

## 2017-12-29 DIAGNOSIS — E78.5 HYPERLIPIDEMIA: ICD-10-CM

## 2017-12-29 DIAGNOSIS — I10 ESSENTIAL (PRIMARY) HYPERTENSION: ICD-10-CM

## 2017-12-29 LAB
A/G RATIO - HISTORICAL: 1.7 (ref 1–2)
ALB RAND URINE - HISTORICAL: 7.8 MG/L
ALBUMIN SERPL-MCNC: 4 G/DL (ref 3.5–5.7)
ALP SERPL-CCNC: 61 IU/L (ref 34–104)
ALT (SGPT) - HISTORICAL: 16 IU/L (ref 7–52)
ANION GAP - HISTORICAL: 11 (ref 5–18)
AST SERPL-CCNC: 14 IU/L (ref 13–39)
BILIRUB SERPL-MCNC: 0.4 MG/DL (ref 0.3–1)
BUN SERPL-MCNC: 20 MG/DL (ref 7–25)
BUN/CREAT RATIO - HISTORICAL: 19
CALCIUM SERPL-MCNC: 9 MG/DL (ref 8.6–10.3)
CHLORIDE SERPLBLD-SCNC: 103 MMOL/L (ref 98–107)
CHOL/HDL RATIO - HISTORICAL: 2.79
CHOLESTEROL TOTAL: 212 MG/DL
CO2 SERPL-SCNC: 28 MMOL/L (ref 21–31)
CREAT SERPL-MCNC: 1.08 MG/DL (ref 0.7–1.3)
CREATININE, URINE - HISTORICAL: 1.06 G/L
ESTIMATED AVERAGE GLUCOSE: 154 MG/DL
GFR IF NOT AFRICAN AMERICAN - HISTORICAL: 50 ML/MIN/1.73M2
GLOBULIN - HISTORICAL: 2.4 G/DL (ref 2–3.7)
GLUCOSE SERPL-MCNC: 194 MG/DL (ref 70–105)
HDLC SERPL-MCNC: 76 MG/DL (ref 23–92)
HEMOGLOBIN A1C MONITORING (POCT) - HISTORICAL: 7 % (ref 4–6.2)
LDLC SERPL CALC-MCNC: 113 MG/DL
MICROALBUMIN, RAND UR - HISTORICAL: 7.4 MG/G CREAT
NON-HDL CHOLESTEROL - HISTORICAL: 136 MG/DL
POTASSIUM SERPL-SCNC: 4.3 MMOL/L (ref 3.5–5.1)
PROT SERPL-MCNC: 6.4 G/DL (ref 6.4–8.9)
PROVIDER ORDERDED STATUS - HISTORICAL: ABNORMAL
SODIUM SERPL-SCNC: 142 MMOL/L (ref 133–143)
TRIGL SERPL-MCNC: 116 MG/DL

## 2017-12-30 NOTE — NURSING NOTE
Patient Information     Patient Name MRN Karen Bruce [id number] Female 1950      Nursing Note by Augusta Ramos at 2017  8:30 AM     Author:  Augusta Ramos Service:  (none) Author Type:  NURS- Student Practical Nurse     Filed:  2017  8:55 AM Encounter Date:  2017 Status:  Signed     :  Augusta Ramos (NURS- Student Practical Nurse)            Patient presents for diabetic check. Patient has no complaints. Augusta Ramos LPN .............2017  8:33 AM

## 2018-01-03 NOTE — TELEPHONE ENCOUNTER
Patient Information     Patient Name MRN Sex Karen Conde [id number] Female 1950      Telephone Encounter by Kendra Romero RN at 3/3/2017  7:53 AM     Author:  Kendra Romero RN Service:  (none) Author Type:  NURS- Registered Nurse     Filed:  3/3/2017  7:57 AM Encounter Date:  3/3/2017 Status:  Signed     :  Kendra Romero RN (NURS- Registered Nurse)            Beta Blockers     Office visit in the past 12 months or per provider note.    Last visit with JAIME MARTI was on: 2016 in [facility]  Next visit with JAIME MARTI is on: 2017 in [facility]  Next visit with Family Practice is on: 2017 in [facility]  Max refill for 12 months from last office visit or per provider note.  Prescription refilled per RN Medication Refill Policy.................... Kendra Romero RN ....................  3/3/2017   7:56 AM

## 2018-01-04 NOTE — PATIENT INSTRUCTIONS
Patient Information     Patient Name MRN Sex Karen Conde [id number] Female 1950      Patient Instructions by Dacia Trujillo at 3/27/2017  9:00 AM     Author:  Dacia Trujillo Service:  (none) Author Type:  (none)     Filed:  3/27/2017  9:05 AM Encounter Date:  3/27/2017 Status:  Signed     :  Dacia Trujillo            Complete the living will form and bring copy to clinic.  Schedule a physical in 3 months.

## 2018-01-04 NOTE — TELEPHONE ENCOUNTER
Patient Information     Patient Name MRN Sex Karen Conde [id number] Female 1950      Telephone Encounter by Luz Levin MD at 2017  5:20 PM     Author:  Luz Levin MD Service:  (none) Author Type:  Physician     Filed:  2017  5:21 PM Encounter Date:  2017 Status:  Signed     :  Luz Levin MD (Physician)            Please call - Cologuard test was negative.  Luz Levin MD

## 2018-01-04 NOTE — TELEPHONE ENCOUNTER
Patient Information     Patient Name MRN Sex Karen Conde [id number] Female 1950      Telephone Encounter by Dacia Trujillo at 2017  9:45 AM     Author:  Dacia Trujillo Service:  (none) Author Type:  (none)     Filed:  2017  9:45 AM Encounter Date:  2017 Status:  Signed     :  Dacia Trujillo            Patient notified of results after verification of birth date.    Dacia Trujillo [facility]....................2017  9:45 AM

## 2018-01-04 NOTE — PROGRESS NOTES
Patient Information     Patient Name MRN Sex Karen Conde [id number] Female 1950      Progress Notes by Luz Levin MD at 3/27/2017  9:00 AM     Author:  Luz Levin MD Service:  (none) Author Type:  Physician     Filed:  3/27/2017  9:37 AM Encounter Date:  3/27/2017 Status:  Signed     :  Lzu Levin MD (Physician)            SUBJECTIVE:    Karen Vargas is a 66 y.o. female who presents for a diabetic check.    Her meter has broken and she needs a prescription for a new one.  Her last DEXA was about 10 years ago and she needs a follow up ordered.  She also had her last colonoscopy in .  She doesn't want another colonoscopy, but is willing to have a cologuard test instead.    Diabetes   She presents for her follow-up diabetic visit. She has type 2 diabetes mellitus. Her disease course has been stable. There are no hypoglycemic associated symptoms. There are no diabetic associated symptoms. There are no hypoglycemic complications. Symptoms are stable. There are no diabetic complications. Risk factors for coronary artery disease include diabetes mellitus, dyslipidemia, hypertension and post-menopausal. Current diabetic treatment includes oral agent (dual therapy). She is compliant with treatment most of the time. Her weight is stable. She is following a diabetic and generally healthy diet. She has not had a previous visit with a dietitian. She participates in exercise daily. (Fasting .) An ACE inhibitor/angiotensin II receptor blocker is being taken. She does not see a podiatrist.Eye exam is current (17).     I personally reviewed medications/allergies/history listed below:      Allergies     Allergen  Reactions     Ceclor [Cefaclor] Rash   ,   Family History       Problem   Relation Age of Onset     Cancer  Father      lung cancer ,        Heart Disease  Mother      Coronary artery disease,        Cancer-colon  Mother      bladder  "cancer, colon cancer, metastatic to liver, colon cancer        Cancer-breast  Sister      metastatic breast cancer at age 63       Cancer  Sister      one  of uterine cancer age 48        Cancer  Sister      one  of bone cancer age 46.       Cancer  Brother      had brain tumors, ultimately committed suicide        Cancer  Sister      lung cancer     ,   Current Outpatient Prescriptions on File Prior to Visit       Medication  Sig Dispense Refill     aspirin enteric coated 81 mg tablet Take 1 tablet by mouth once daily with a meal.  0     blood sugar diagnostic (ACCU-CHEK DEE PLUS TEST STRP) strip Dispense test strips covered by the patient insurance. Test 2 time per day.  ICD E11.9 200 Strip 4     Blood-Glucose Meter (ACCU-CHEK DEE PLUS METER) Dispense glucose meter, test strips and lancets covered by the patient insurance. Test 1 time per day.  Diagnosis 250.0. 1 Device 0     cyanocobalamin (VITAMIN B12) 1,000 mcg/mL injection INJECT 1MLINTRAMUSCULAR EVERY 4 WEEKS. 3 mL 3     ferrous gluconate 325 mg (37 mg iron) tablet Take 1 tablet by mouth once daily with a meal.  0     glucosamine sulfate 1,000 mg cap Take 2 capsules by mouth once daily.  0     Insulin Needles, Disposable, (BD INSULIN PEN NEEDLE UF MINI) 31 gauge x 3/16\" Test blood sugars twice daily ICD E11.9. 200 Each 3     lancets (ACCU-CHEK SOFTCLIX LANCETS) Test 1 times per day.  Diagnosis 250.0. 100 Each 3     liraglutide (VICTOZA 3-JOSE MIGUEL) 0.6 mg/0.1 mL (18 mg/3 mL) injection Start with 0.6 mg subcutaneous daily x 1 week, then increase to 1.2 mg subcutaneous daily. 3 mL 11     lisinopril (PRINIVIL; ZESTRIL) 2.5 mg tablet Take 1 tablet by mouth once daily. 90 tablet 3     loratadine (CLARITIN) 10 mg tablet TAKE ONE TABLET BY MOUTH ONE TIME DAILY 90 tablet 3     metFORMIN (GLUCOPHAGE) 1,000 mg tablet Take 1 tablet by mouth 2 times daily with meals. 180 tablet 3     metoprolol tartrate (LOPRESSOR) 50 mg tablet TAKE 0.5 TABLETS BY MOUTH 2 TIMES " DAILY. 60 tablet 2     multivitamin (MVI) tablet Take 1 tablet by mouth once daily.  0     omega-3 fatty acids-vitamin E (FISH OIL) 1,000 mg cap Take 1 capsule by mouth once daily.  0     ONE TOUCH ULTRA TEST strip TEST TWICE DAILY 200 Each 3     simvastatin (ZOCOR) 10 mg tablet TAKE ONE TABLET BY MOUTH ONE TIME DAILY 90 tablet 3     triamterene-hydrochlorothiazide, 37.5-25 mg, (MAXZIDE-25) 37.5-25 mg tablet TAKE HALF TABLET BY MOUTH DAILY 45 tablet 3     No current facility-administered medications on file prior to visit.    ,   Past Medical History      Diagnosis   Date     Basal cell carcinoma of nose  2/25/2015     Diabetes mellitus (HC)       History of pregnancy       G2, P2-0-0-2      Intentional weight loss       History of morbid obesity, weight loss on byetta    ,   Patient Active Problem List     Diagnosis  Code     ALLERGIC RHINITIS, SEASONAL J30.1     DIABETES MELLITUS, TYPE II E11.9     HYPERTENSION I10     HYPERLIPIDEMIA E78.5     OVER WEIGHT E66.9     HIP PAIN, RIGH,T SI JOINT DYSFUNCTION M25.559    and   Past Surgical History       Procedure   Laterality Date     Refractive surgery   07/2000     LASIK refractive eye surgery 7/00       Carpal tunnel release   11/2000, 12/2000     Laparoscopic carpal tunnel release right hand, left hand       Gastric bypass        Silvestre-en-Y gastric bypass with incidental cholecystectomy and umbilical hernia repair       Lap cholecystectomy        Hernia repair         umbilical hernia repair       Colonoscopy   12/6/2006     10 year f/u        Knee arthroscopy  Left 2008     Cataract extraction w/  intraocular lens implant  Left 6/15/16     Social History     Social History        Marital status:       Spouse name: N/A     Number of children:  N/A     Years of education:  N/A     Occupational History      Not on file.     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     - Georgi - metastatic pancreatic cancer,  2012.    Peña 72 son    Tsering 10/4/74 daughter    grandson - Barry             REVIEW OF SYSTEMS:  Review of Systems   All other systems reviewed and are negative.      OBJECTIVE:  /78  Temp 98.2  F (36.8  C) (Tympanic)  Wt 80.3 kg (177 lb)  Breastfeeding? No  BMI 33.44 kg/m2    EXAM:   Physical Exam   Constitutional: She is well-developed, well-nourished, and in no distress. No distress.   HENT:   Head: Normocephalic and atraumatic.   Eyes: Pupils are equal, round, and reactive to light.   Neck: Normal range of motion. Neck supple. No thyromegaly present.   Cardiovascular: Normal rate, regular rhythm, normal heart sounds and intact distal pulses.  Exam reveals no gallop and no friction rub.    No murmur heard.  Pulmonary/Chest: Effort normal and breath sounds normal. No respiratory distress. She has no wheezes. She has no rales.   Lymphadenopathy:     She has no cervical adenopathy.   Neurological:   Normal monofilament exam in feet bilaterally.   Skin: Skin is warm and dry.   No lesions on feet.   Psychiatric: Affect normal.   PHQ Depression Screen  Date of PHQ exam: 17  Over the last 2 weeks, how often have you been bothered by any of the following problems?  1. Little interest or pleasure in doing things: 0 - Not at all  2. Feeling down, depressed, or hopeless: 0 - Not at all                                           I personally reviewed results with patient as listed below:   Results for orders placed or performed in visit on 17       COMP METABOLIC PANEL      Result  Value Ref Range    SODIUM 142 133 - 143 mmol/L    POTASSIUM 3.8 3.5 - 5.1 mmol/L    CHLORIDE 106 98 - 107 mmol/L    CO2,TOTAL 28 21 - 31 mmol/L    ANION GAP 8 5 - 18                    GLUCOSE 109 (H) 70 - 105 mg/dL    CALCIUM 9.4 8.6 - 10.3 mg/dL    BUN 15 7 - 25 mg/dL    CREATININE 0.81 0.70 - 1.30 mg/dL    BUN/CREAT RATIO           19                    GFR if African American >60 >60 ml/min/1.73m2    GFR if not African American >60 >60 ml/min/1.73m2    ALBUMIN 4.1 3.5 - 5.7 g/dL    PROTEIN,TOTAL 6.1 (L) 6.4 - 8.9 g/dL    GLOBULIN                  2.0 2.0 - 3.7 g/dL    A/G RATIO 2.1 (H) 1.0 - 2.0                    BILIRUBIN,TOTAL 0.4 0.3 - 1.0 mg/dL    ALK PHOSPHATASE 56 34 - 104 IU/L    ALT (SGPT) 15 7 - 52 IU/L    AST (SGOT) 14 13 - 39 IU/L        ASSESSMENT/PLAN:    ICD-10-CM    1. DIABETES MELLITUS, TYPE II E11.9 blood-glucose meter      HEMOGLOBIN A1C MONITORING (POCT)      COMP METABOLIC PANEL      LIPID PANEL      TSH   2. HYPERTENSION I10    3. Hyperlipidemia, unspecified hyperlipidemia type E78.5    4. Iron deficiency E61.1 CBC AND DIFFERENTIAL      FERRITIN   5. Post-menopausal Z78.0 XR DXA BONE DENSITY 2 SITES AXIAL   6. Screen for colon cancer Z12.11         Plan:    1.  A1c is pending today.  Blood pressure is well controlled. Eye exam is up to date.  On aspirin daily.  Non-smoker.  On ACE-I and statin.  2.  Blood pressure is stable as above.  3.  Lipids will be checked with labs at next visit.  4.  She will have a recheck of complete blood count and ferritin at her next visit as well.  She has a history of bariatric surgery.  5.  She agreed to have another screening DEXA, which will be scheduled.  6.  Cologuard test was ordered.  Luz Levin MD ....................  3/27/2017   9:36 AM

## 2018-01-08 ENCOUNTER — AMBULATORY - GICH (OUTPATIENT)
Dept: SCHEDULING | Facility: OTHER | Age: 68
End: 2018-01-08

## 2018-01-20 ENCOUNTER — COMMUNICATION - GICH (OUTPATIENT)
Dept: FAMILY MEDICINE | Facility: OTHER | Age: 68
End: 2018-01-20

## 2018-01-20 DIAGNOSIS — I10 ESSENTIAL (PRIMARY) HYPERTENSION: ICD-10-CM

## 2018-01-25 ENCOUNTER — DOCUMENTATION ONLY (OUTPATIENT)
Dept: FAMILY MEDICINE | Facility: OTHER | Age: 68
End: 2018-01-25

## 2018-01-25 PROBLEM — E11.9 DIABETES MELLITUS, TYPE II (H): Status: ACTIVE | Noted: 2018-01-25

## 2018-01-25 PROBLEM — J30.1 ALLERGIC RHINITIS DUE TO POLLEN: Status: ACTIVE | Noted: 2018-01-25

## 2018-01-25 PROBLEM — E78.5 HYPERLIPIDEMIA: Status: ACTIVE | Noted: 2018-01-25

## 2018-01-25 PROBLEM — I10 HYPERTENSION: Status: ACTIVE | Noted: 2018-01-25

## 2018-01-25 RX ORDER — INSULIN PUMP SYRINGE, 3 ML
EACH MISCELLANEOUS
COMMUNITY
Start: 2017-03-27

## 2018-01-25 RX ORDER — DIPHENOXYLATE HYDROCHLORIDE AND ATROPINE SULFATE 2.5; .025 MG/1; MG/1
1 TABLET ORAL DAILY
COMMUNITY
Start: 2013-01-17

## 2018-01-25 RX ORDER — TRIAMTERENE/HYDROCHLOROTHIAZID 37.5-25 MG
0.5 TABLET ORAL DAILY
COMMUNITY
Start: 2016-11-07 | End: 2018-06-29

## 2018-01-25 RX ORDER — LANCETS
EACH MISCELLANEOUS DAILY
COMMUNITY
Start: 2017-12-29 | End: 2018-06-29

## 2018-01-25 RX ORDER — CHLORAL HYDRATE 500 MG
1 CAPSULE ORAL DAILY
COMMUNITY
Start: 2013-01-17

## 2018-01-25 RX ORDER — SIMVASTATIN 10 MG
10 TABLET ORAL DAILY
COMMUNITY
Start: 2017-11-19 | End: 2018-08-12

## 2018-01-25 RX ORDER — LISINOPRIL 2.5 MG/1
2.5 TABLET ORAL DAILY
COMMUNITY
Start: 2017-12-11 | End: 2018-06-29

## 2018-01-25 RX ORDER — BLOOD-GLUCOSE METER
EACH MISCELLANEOUS
COMMUNITY
Start: 2014-04-02 | End: 2018-06-29

## 2018-01-25 RX ORDER — CYANOCOBALAMIN 1000 UG/ML
1 INJECTION, SOLUTION INTRAMUSCULAR; SUBCUTANEOUS
COMMUNITY
Start: 2017-11-27 | End: 2018-10-21

## 2018-01-25 RX ORDER — LORATADINE 10 MG/1
10 TABLET ORAL DAILY
COMMUNITY
Start: 2017-08-28 | End: 2018-05-19

## 2018-01-25 RX ORDER — ASPIRIN 81 MG/1
81 TABLET ORAL
COMMUNITY
Start: 2013-01-17

## 2018-01-25 RX ORDER — PETROLATUM,WHITE/LANOLIN
2000 OINTMENT (GRAM) TOPICAL DAILY
COMMUNITY
Start: 2013-01-17

## 2018-01-27 VITALS
DIASTOLIC BLOOD PRESSURE: 78 MMHG | BODY MASS INDEX: 33.44 KG/M2 | SYSTOLIC BLOOD PRESSURE: 122 MMHG | WEIGHT: 171.6 LBS | HEIGHT: 61 IN | BODY MASS INDEX: 32.4 KG/M2 | TEMPERATURE: 98.2 F | DIASTOLIC BLOOD PRESSURE: 78 MMHG | WEIGHT: 177 LBS | TEMPERATURE: 97.3 F | SYSTOLIC BLOOD PRESSURE: 114 MMHG

## 2018-01-27 VITALS
TEMPERATURE: 97.3 F | SYSTOLIC BLOOD PRESSURE: 122 MMHG | HEART RATE: 63 BPM | BODY MASS INDEX: 32.53 KG/M2 | WEIGHT: 173.4 LBS | DIASTOLIC BLOOD PRESSURE: 76 MMHG

## 2018-02-08 DIAGNOSIS — E11.9 TYPE II DIABETES MELLITUS (H): Primary | ICD-10-CM

## 2018-02-08 DIAGNOSIS — I10 HTN (HYPERTENSION): ICD-10-CM

## 2018-02-09 VITALS
DIASTOLIC BLOOD PRESSURE: 80 MMHG | BODY MASS INDEX: 33.04 KG/M2 | WEIGHT: 175 LBS | HEIGHT: 61 IN | SYSTOLIC BLOOD PRESSURE: 120 MMHG | HEART RATE: 72 BPM

## 2018-02-12 NOTE — NURSING NOTE
Patient Information     Patient Name MRN Sex Karen Conde [id number] Female 1950      Nursing Note by Carolina Pierre at 2017  9:15 AM     Author:  Carolina Pierre Service:  (none) Author Type:  (none)     Filed:  2017  9:37 AM Encounter Date:  2017 Status:  Signed     :  Carolina Pierre            Previous A1C is at goal of <8  HEMOGLOBIN A1C MONITORING (POCT)    Date Value   2017 7.0 % (H)   2013 5.5 % NGSP     Urine microalbumin:creatine: 0  Foot exam last office visit   Eye exam 2017    Patient is not a current smoker  Patient is on a daily aspirin  Patient is on a Statin.  Blood pressure today of 120/80 is at the goal of <139/89 for diabetics.    Carolina Pierre LPN..............2017 9:26 AM

## 2018-02-12 NOTE — PROGRESS NOTES
Patient Information     Patient Name MRN Sex Karen Conde [id number] Female 1950      Progress Notes by Luz Levin MD at 2017  9:15 AM     Author:  Luz Levin MD Service:  (none) Author Type:  Physician     Filed:  2017  5:48 PM Encounter Date:  2017 Status:  Signed     :  Luz Levin MD (Physician)            SUBJECTIVE:    Karen Vargas is a 67 y.o. female who presents for a diabetic check.    Quit taking Victoza due to cost.  Started back on some metfomin that she still had on hand at home from previous, but ran out about a week ago.  Started Weight Watchers 3 days ago and is hoping to lose some weight as well..    Diabetes   She presents for her follow-up diabetic visit. She has type 2 diabetes mellitus. No MedicAlert identification noted. Her disease course has been stable. There are no hypoglycemic associated symptoms. There are no diabetic associated symptoms. Pertinent negatives for diabetes include no blurred vision, no chest pain, no fatigue, no foot paresthesias, no foot ulcerations, no polydipsia, no polyphagia, no polyuria, no visual change, no weakness and no weight loss. There are no hypoglycemic complications. Symptoms are stable. There are no diabetic complications. Risk factors for coronary artery disease include diabetes mellitus, dyslipidemia, hypertension, sedentary lifestyle and obesity. Current diabetic treatment includes oral agent (monotherapy). She is compliant with treatment most of the time. Her weight is increasing steadily. She is following a generally healthy diet. When asked about meal planning, she reported none. She has not had a previous visit with a dietitian. She participates in exercise intermittently. An ACE inhibitor/angiotensin II receptor blocker is being taken. She sees a podiatrist.Eye exam is current (scheduled 17.).     I personally reviewed medications/allergies/history listed below:      Allergies     Allergen  Reactions     Ceclor [Cefaclor] Rash   ,   Family History       Problem   Relation Age of Onset     Cancer  Father      lung cancer ,        Heart Disease  Mother      Coronary artery disease,        Cancer-colon  Mother      bladder cancer, colon cancer, metastatic to liver, colon cancer        Cancer-breast  Sister      metastatic breast cancer at age 63       Cancer  Sister      one  of uterine cancer age 48        Cancer  Sister      one  of bone cancer age 46.       Cancer  Brother      had brain tumors, ultimately committed suicide        Cancer  Sister      lung cancer     ,   Current Outpatient Prescriptions on File Prior to Visit       Medication  Sig Dispense Refill     aspirin enteric coated 81 mg tablet Take 1 tablet by mouth once daily with a meal.  0     Blood-Glucose Meter (ACCU-CHEK DEE PLUS METER) Dispense glucose meter, test strips and lancets covered by the patient insurance. Test 1 time per day.  Diagnosis 250.0. 1 Device 0     blood-glucose meter Accu chek Dee Plus meter.  E11.9 NIDDM type II - Test 1 time/day. 1 Device 0     cyanocobalamin (VITAMIN B12) 1,000 mcg/mL injection INJECT 1ML INTRAMUSCULARLY EVERY 4 WEEKS. 3 mL 3     cyanocobalamin (VITAMIN B12) 1,000 mcg/mL injection INJECT 1ML INTRAMUSCULAR EVERY 4 WEEKS. 3 mL 3     ferrous gluconate 325 mg (37 mg iron) tablet Take 1 tablet by mouth once daily with a meal.  0     glucosamine sulfate 1,000 mg cap Take 2 capsules by mouth once daily.  0     lisinopril (PRINIVIL; ZESTRIL) 2.5 mg tablet TAKE 1 TABLET BY MOUTH ONCE DAILY. 90 tablet 2     loratadine (CLARITIN) 10 mg tablet TAKE ONE TABLET BY MOUTH ONE TIME DAILY 90 tablet 2     multivitamin (MVI) tablet Take 1 tablet by mouth once daily.  0     omega-3 fatty acids-vitamin E (FISH OIL) 1,000 mg cap Take 1 capsule by mouth once daily.  0     triamterene-hydrochlorothiazide, 37.5-25 mg, (MAXZIDE-25) 37.5-25 mg tablet TAKE HALF TABLET  BY MOUTH DAILY 45 tablet 3     No current facility-administered medications on file prior to visit.    ,   Past Medical History:     Diagnosis  Date     Basal cell carcinoma of nose 2/25/2015     Diabetes mellitus (HC)      History of pregnancy     G2, P2-0-0-2      Intentional weight loss     History of morbid obesity, weight loss on byetta    ,   Patient Active Problem List     Diagnosis  Code     ALLERGIC RHINITIS, SEASONAL J30.1     DIABETES MELLITUS, TYPE II E11.9     HYPERTENSION I10     HYPERLIPIDEMIA E78.5     OVER WEIGHT E66.9     HIP PAIN, RIGH,T SI JOINT DYSFUNCTION M25.559    and   Past Surgical History:      Procedure  Laterality Date     CARPAL TUNNEL RELEASE  11/2000, 12/2000    Laparoscopic carpal tunnel release right hand, left hand       CATARACT EXTRACTION W/  INTRAOCULAR LENS IMPLANT Left 6/15/16     COLONOSCOPY  12/6/2006    10 year f/u        GASTRIC BYPASS      Silvestre-en-Y gastric bypass with incidental cholecystectomy and umbilical hernia repair       HERNIA REPAIR       umbilical hernia repair       KNEE ARTHROSCOPY Left 2008     LAP CHOLECYSTECTOMY       REFRACTIVE SURGERY  07/2000    LASIK refractive eye surgery 7/00       Social History     Social History        Marital status:       Spouse name: N/A     Number of children:  N/A     Years of education:  N/A     Occupational History      Not on file.     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     - Georgi - metastatic pancreatic  "cancer,  2012.    Peña 72 son    Tsering 10/4/74 daughter    grandson Nguyen Reddy              REVIEW OF SYSTEMS:  Review of Systems   Constitutional: Negative for fatigue and weight loss.   Eyes: Negative for blurred vision.   Cardiovascular: Negative for chest pain.   Neurological: Negative for weakness.   Endo/Heme/Allergies: Negative for polydipsia and polyphagia.       OBJECTIVE:  /80 (Cuff Site: Right Arm, Position: Sitting, Cuff Size: Adult Regular)  Pulse 72  Ht 1.549 m (5' 1\")  Wt 79.4 kg (175 lb)  BMI 33.07 kg/m2    EXAM:   Physical Exam   Constitutional: She is well-developed, well-nourished, and in no distress. No distress.   HENT:   Head: Normocephalic and atraumatic.   Eyes: Pupils are equal, round, and reactive to light.   Neck: Normal range of motion. Neck supple. No thyromegaly present.   Cardiovascular: Normal rate, regular rhythm, normal heart sounds and intact distal pulses.  Exam reveals no gallop and no friction rub.    No murmur heard.  Pulmonary/Chest: Effort normal and breath sounds normal. No respiratory distress. She has no wheezes. She has no rales.   Lymphadenopathy:     She has no cervical adenopathy.   Neurological:   Normal monofilament exam in feet bilaterally.   Skin: Skin is warm and dry.   No lesions on feet.   Psychiatric:   PHQ Depression Screen     Over the last 2 weeks, how often have you been bothered by any of the following problems?  1. Little interest or pleasure in doing things: 0 - Not at all  2. Feeling down, depressed, or hopeless: 0 - Not at all                                             I personally reviewed results with patient as listed below:   Results for orders placed or performed in visit on 17      Hgb A1c      Result  Value Ref Range    HEMOGLOBIN A1C MONITORING (POCT) 7.0 (H) 4.0 - 6.2 %    ESTIMATED AVERAGE GLUCOSE  154 mg/dL   COMP METABOLIC PANEL      Result  Value Ref Range    SODIUM 142 133 - 143 mmol/L    POTASSIUM 4.3 3.5 - 5.1 " "mmol/L    CHLORIDE 103 98 - 107 mmol/L    CO2,TOTAL 28 21 - 31 mmol/L    ANION GAP 11 5 - 18                    GLUCOSE 194 (H) 70 - 105 mg/dL    CALCIUM 9.0 8.6 - 10.3 mg/dL    BUN 20 7 - 25 mg/dL    CREATININE 1.08 0.70 - 1.30 mg/dL    BUN/CREAT RATIO           19                    GFR if African American >60 >60 ml/min/1.73m2    GFR if not African American 50 (L) >60 ml/min/1.73m2    ALBUMIN 4.0 3.5 - 5.7 g/dL    PROTEIN,TOTAL 6.4 6.4 - 8.9 g/dL    GLOBULIN                  2.4 2.0 - 3.7 g/dL    A/G RATIO 1.7 1.0 - 2.0                    BILIRUBIN,TOTAL 0.4 0.3 - 1.0 mg/dL    ALK PHOSPHATASE 61 34 - 104 IU/L    ALT (SGPT) 16 7 - 52 IU/L    AST (SGOT) 14 13 - 39 IU/L   LIPID PANEL      Result  Value Ref Range    CHOLESTEROL,TOTAL 212 (H) <200 mg/dL    TRIGLYCERIDES 116 <150 mg/dL    HDL CHOLESTEROL 76 23 - 92 mg/dL    NON-HDL CHOLESTEROL 136 <145 mg/dl    CHOL/HDL RATIO            2.79 <4.50                    LDL CHOLESTEROL 113 (H) <100 mg/dL    PROVIDER ORDERED STATUS FASTING         ASSESSMENT/PLAN:    ICD-10-CM    1. Type 2 diabetes mellitus without complication, without long-term current use of insulin (HC) E11.9 lancets (ACCU-CHEK SOFTCLIX LANCETS)      blood sugar diagnostic (ACCU-CHEK DEE PLUS TEST STRP) strip      metFORMIN (GLUCOPHAGE) 1,000 mg tablet      DISCONTINUED: Insulin Needles, Disposable, (BD INSULIN PEN NEEDLE UF MINI) 31 gauge x 3/16\"   2. HYPERTENSION I10    3. Hyperlipidemia, unspecified hyperlipidemia type E78.5         Plan:    1. Refilled metformin.  Stable.  A1c slightly higher than we would like. She is going to continue taking the metformin and will give this 3 months to see how she is doing at her next check.  Blood pressure is well controlled. Eye exam is up to date.  On aspirin daily.  Non-smoker.  On ACE-I and statin.  2. Blood pressure well-controlled.  3. Lipids are slightly elevated compared to what they've been in the past year. Hopefully with weight loss, these will " improve as well. No changes to statin made today.  Luz Levin MD

## 2018-02-12 NOTE — TELEPHONE ENCOUNTER
Patient Information     Patient Name MRN Sex Karen Conde [id number] Female 1950      Telephone Encounter by Kendra Romero RN at 2017  7:37 AM     Author:  Kendra Romero RN Service:  (none) Author Type:  NURS- Registered Nurse     Filed:  2017  7:40 AM Encounter Date:  2017 Status:  Signed     :  Kendra Romero RN (NURS- Registered Nurse)            Ace Inhibitors  Office visit in the past 12 months or per provider note.  Last visit with JAIME MARTI was on: 2017 in [facility]  Next visit with JAIME MARTI is on: 2017 in [facility]  Lab test requirements:  Creatinine and Potassium annually, if ordering lab, order BMP.  CREATININE (mg/dL)    Date Value   2017 0.96     POTASSIUM (mmol/L)    Date Value   2017 4.2   Max refill for 12 months from last office visit or per provider note  Prescription refilled per RN Medication Refill Policy.................... Kendra Romero RN ....................  2017   7:40 AM

## 2018-02-13 NOTE — TELEPHONE ENCOUNTER
Patient Information     Patient Name MRN Sex Karen Conde [id number] Female 1950      Telephone Encounter by Kelly Randle RN at 2018  8:13 AM     Author:  Kelly Randle RN Service:  (none) Author Type:  NURS- Registered Nurse     Filed:  2018  8:13 AM Encounter Date:  2018 Status:  Signed     :  Kelly Randle RN (NURS- Registered Nurse)            Diuretics (may be prescribed for edema)    Office visit in the past 12 months or per provider note.    Last visit with JAIME MARTI was on: 2017 in [facility]  Next visit with JAIME MARTI is on: No future appointment listed with this provider  Next visit with Family Practice is on: No future appointment listed in this department    Lab testing requirements:  Creatinine and Potassium annually, if ordering lab, order BMP.  CREATININE (mg/dL)    Date Value   2017 1.08     POTASSIUM (mmol/L)    Date Value   2017 4.3       Review last provider visit note.  If BP reviewed and plan is noted, can refill.  Max refill for 12 months from last office visit or per provider note.    Prescription refilled per RN Medication Refill Policy.................... KELLY RANDLE RN ....................  2018   8:13 AM

## 2018-02-19 ENCOUNTER — EXTERNAL ORDER RESULTS (OUTPATIENT)
Dept: FAMILY MEDICINE | Facility: OTHER | Age: 68
End: 2018-02-19

## 2018-03-29 ENCOUNTER — OFFICE VISIT (OUTPATIENT)
Dept: FAMILY MEDICINE | Facility: OTHER | Age: 68
End: 2018-03-29
Attending: FAMILY MEDICINE
Payer: MEDICARE

## 2018-03-29 ENCOUNTER — TELEPHONE (OUTPATIENT)
Dept: FAMILY MEDICINE | Facility: OTHER | Age: 68
End: 2018-03-29

## 2018-03-29 VITALS
HEART RATE: 77 BPM | SYSTOLIC BLOOD PRESSURE: 124 MMHG | HEIGHT: 61 IN | DIASTOLIC BLOOD PRESSURE: 74 MMHG | BODY MASS INDEX: 34.17 KG/M2 | WEIGHT: 181 LBS

## 2018-03-29 DIAGNOSIS — E11.9 TYPE 2 DIABETES MELLITUS WITHOUT COMPLICATION, WITHOUT LONG-TERM CURRENT USE OF INSULIN (H): Primary | ICD-10-CM

## 2018-03-29 DIAGNOSIS — E11.9 TYPE II DIABETES MELLITUS (H): ICD-10-CM

## 2018-03-29 DIAGNOSIS — I10 HTN (HYPERTENSION): ICD-10-CM

## 2018-03-29 DIAGNOSIS — I10 ESSENTIAL HYPERTENSION: ICD-10-CM

## 2018-03-29 DIAGNOSIS — E78.00 PURE HYPERCHOLESTEROLEMIA: ICD-10-CM

## 2018-03-29 LAB
ALBUMIN SERPL-MCNC: 4.1 G/DL (ref 3.5–5.7)
ALP SERPL-CCNC: 57 U/L (ref 34–104)
ALT SERPL W P-5'-P-CCNC: 16 U/L (ref 7–52)
ANION GAP SERPL CALCULATED.3IONS-SCNC: 6 MMOL/L (ref 3–14)
AST SERPL W P-5'-P-CCNC: 16 U/L (ref 13–39)
BILIRUB SERPL-MCNC: 0.4 MG/DL (ref 0.3–1)
BUN SERPL-MCNC: 15 MG/DL (ref 7–25)
CALCIUM SERPL-MCNC: 9.2 MG/DL (ref 8.6–10.3)
CHLORIDE SERPL-SCNC: 104 MMOL/L (ref 98–107)
CHOLEST SERPL-MCNC: 198 MG/DL
CO2 SERPL-SCNC: 30 MMOL/L (ref 21–31)
CREAT SERPL-MCNC: 0.89 MG/DL (ref 0.6–1.2)
GFR SERPL CREATININE-BSD FRML MDRD: 63 ML/MIN/1.7M2
GLUCOSE SERPL-MCNC: 143 MG/DL (ref 70–105)
HDLC SERPL-MCNC: 84 MG/DL (ref 23–92)
LDLC SERPL CALC-MCNC: 86 MG/DL
NONHDLC SERPL-MCNC: 114 MG/DL
POTASSIUM SERPL-SCNC: 3.8 MMOL/L (ref 3.5–5.1)
PROT SERPL-MCNC: 6.5 G/DL (ref 6.4–8.9)
SODIUM SERPL-SCNC: 140 MMOL/L (ref 134–144)
TRIGL SERPL-MCNC: 142 MG/DL

## 2018-03-29 PROCEDURE — G0463 HOSPITAL OUTPT CLINIC VISIT: HCPCS

## 2018-03-29 PROCEDURE — 80061 LIPID PANEL: CPT | Performed by: FAMILY MEDICINE

## 2018-03-29 PROCEDURE — 36415 COLL VENOUS BLD VENIPUNCTURE: CPT | Performed by: FAMILY MEDICINE

## 2018-03-29 PROCEDURE — 83036 HEMOGLOBIN GLYCOSYLATED A1C: CPT | Performed by: FAMILY MEDICINE

## 2018-03-29 PROCEDURE — 80053 COMPREHEN METABOLIC PANEL: CPT | Performed by: FAMILY MEDICINE

## 2018-03-29 PROCEDURE — 99213 OFFICE O/P EST LOW 20 MIN: CPT | Performed by: FAMILY MEDICINE

## 2018-03-29 RX ORDER — TRIAMTERENE/HYDROCHLOROTHIAZID 37.5-25 MG
0.5 TABLET ORAL DAILY
COMMUNITY
Start: 2018-01-24 | End: 2018-09-25

## 2018-03-29 RX ORDER — METOPROLOL TARTRATE 50 MG
25 TABLET ORAL 2 TIMES DAILY
Refills: 3 | COMMUNITY
Start: 2018-01-19 | End: 2018-09-25

## 2018-03-29 ASSESSMENT — ENCOUNTER SYMPTOMS
UNEXPECTED WEIGHT CHANGE: 0
COUGH: 0
FATIGUE: 0

## 2018-03-29 NOTE — TELEPHONE ENCOUNTER
Please call - due to an error with our new lab ordering process, her A1c was not run.  Unfortunately, because an extra tube of blood was not drawn, it cannot be added on either.  I do have new orders in for an A1c for her if she wants to have it drawn another time when she knows she will be in town.  I am very sorry about this!  Luz Levin

## 2018-03-29 NOTE — NURSING NOTE
Previous A1C is at goal of <8  No results found for: A1C  Urine microalbumin:creatine: 0  Foot exam last office visit  Eye exam  12/20/17  Tobacco User no  Patient is on a daily aspirin  Patient is on a Statin.  Blood pressure today of:124/74     BP Readings from Last 1 Encounters:   03/29/18 124/74      is at the goal of <139/89 for diabetics.    Carolina Pierre LPN on 3/29/2018 at 8:47 AM

## 2018-03-29 NOTE — MR AVS SNAPSHOT
After Visit Summary   3/29/2018    Karen Vargas    MRN: [id number]           Patient Information     Date Of Birth          1950        Visit Information        Provider Department      3/29/2018 9:15 AM Luz Levin MD [facility]        Today's Diagnoses     Type 2 diabetes mellitus without complication, without long-term current use of insulin (H)    -  1    Pure hypercholesterolemia        Essential hypertension           Follow-ups after your visit        Your next 10 appointments already scheduled     Jun 29, 2018  8:15 AM CDT   LAB with [facility] LAB   [facility]    [address] [facility] [id number] [phone]           Please do not eat 10-12 hours before your appointment if you are coming in fasting for labs on lipids, cholesterol, or glucose (sugar). This does not apply to pregnant women. Water, hot tea and black coffee (with nothing added) are okay. Do not drink other fluids, diet soda or chew gum.            Jun 29, 2018  9:15 AM CDT   Office Visit with Luz Levin MD   [facility]    [address]  [place] [address] [phone]           Bring a current list of meds and any records pertaining to this visit. For Physicals, please bring immunization records and any forms needing to be filled out. Please arrive 10 minutes early to complete paperwork.              Who to contact     If you have questions or need follow up information about today's clinic visit or your schedule please contact [facility] directly at [phone].  Normal or non-critical lab and imaging results will be communicated to you by MyChart, letter or phone within 4 business days after the clinic has received the results. If you do not hear from us within 7 days, please contact the clinic through [employer] or  "phone. If you have a critical or abnormal lab result, we will notify you by phone as soon as possible.  Submit refill requests through JackBe or call your pharmacy and they will forward the refill request to us. Please allow 3 business days for your refill to be completed.          Additional Information About Your Visit        goCatchhart Information     JackBe lets you send messages to your doctor, view your test results, renew your prescriptions, schedule appointments and more. To sign up, go to [email or web address] . Click on \"Log in\" on the left side of the screen, which will take you to the Welcome page. Then click on \"Sign up Now\" on the right side of the page.     You will be asked to enter the access code listed below, as well as some personal information. Please follow the directions to create your username and password.     Your access code is: [email or web address]  Expires: 2018  9:08 AM     Your access code will  in 90 days. If you need help or a new code, please call your [place] clinic or [phone].        Care EveryWhere ID     This is your Care EveryWhere ID. This could be used by other organizations to access your [place] medical records  [id number]        Your Vitals Were     Pulse Height BMI (Body Mass Index)             77 1.549 m (5' 1\") 34.2 kg/m2          Blood Pressure from Last 3 Encounters:   18 124/74   17 120/80   17 122/76    Weight from Last 3 Encounters:   18 82.1 kg (181 lb)   17 79.4 kg (175 lb)   17 78.7 kg (173 lb 6.4 oz)              Today, you had the following     No orders found for display       Primary Care Provider Office Phone # Fax #    Luz Levin [phone] [address] [place] [address]        Equal Access to Services     [facility] IESHA : Vianney Yoon, wadiego chavarria, qaybta kev oseguera. So [facility] " [phone].    ATENCIÓN: Si yue boswell, tiene a blackburn disposición servicios gratuitos de asistencia lingüística. Carmen arreola [phone].    We comply with applicable federal civil rights laws and [place] laws. We do not discriminate on the basis of race, color, national origin, age, disability, sex, sexual orientation, or gender identity.            Thank you!     Thank you for choosing [facility] AND [facility]  for your care. Our goal is always to provide you with excellent care. Hearing back from our patients is one way we can continue to improve our services. Please take a few minutes to complete the written survey that you may receive in the mail after your visit with us. Thank you!             Your Updated Medication List - Protect others around you: Learn how to safely use, store and throw away your medicines at www.disposemymeds.org.          This list is accurate as of 3/29/18  9:17 AM.  Always use your most recent med list.                   Brand Name Dispense Instructions for use Diagnosis    aspirin EC 81 MG EC tablet      Take 81 mg by mouth daily with food        blood glucose monitoring lancets      daily        blood glucose monitoring test strip    no brand specified     Dispense test strips covered by the patient insurance. Test 2 time per day.  ICD E11.9        cyanocobalamin 1000 MCG/ML injection    VITAMIN B12     Inject 1 mL into the muscle every 30 days        * GLUCOCOM BLOOD GLUCOSE MONITOR Bianka      Dispense glucose meter, test strips and lancets covered by the patient insurance. Test 1 time per day.  Diagnosis 250.0.        * FIFTY50 GLUCOSE METER 2.0 W/DEVICE Kit      Accu chek Lenora Plus meter.  E11.9 NIDDM type II - Test 1 time/day.        fish oil-omega-3 fatty acids 1000 MG capsule      Take 1 capsule by mouth daily        glucosamine sulfate 1000 MG Caps      Take 2,000 mg by mouth daily        IRON PO      Take 325 mg by mouth daily with food        lisinopril 2.5 MG  tablet    PRINIVIL/Zestril     Take 2.5 mg by mouth daily        loratadine 10 MG tablet    CLARITIN     Take 10 mg by mouth daily        metFORMIN 1000 MG tablet    GLUCOPHAGE     Take 1,000 mg by mouth 2 times daily (with meals)        metoprolol tartrate 50 MG tablet    LOPRESSOR     Take 25 mg by mouth 2 times daily        MULTI-VITAMINS Tabs      Take 1 tablet by mouth daily        simvastatin 10 MG tablet    ZOCOR     Take 10 mg by mouth daily        * triamterene-hydrochlorothiazide 37.5-25 MG per tablet    MAXZIDE-25     Take 0.5 tablets by mouth daily        * triamterene-hydrochlorothiazide 37.5-25 MG per tablet    MAXZIDE-25     Take 0.5 tablets by mouth daily        * Notice:  This list has 4 medication(s) that are the same as other medications prescribed for you. Read the directions carefully, and ask your doctor or other care provider to review them with you.

## 2018-03-29 NOTE — PROGRESS NOTES
"  SUBJECTIVE:   Karen Vargas is a 67 year old female who presents to clinic today for a diabetic check.  Walking every day.  She is doing weight watchers right now, although she frustratingly has gained 3 lb since she started on that program in January.  She is trying to walk most every day and is watching what she is eating very closely.    Diabetes     Diabetes:     Frequency of checking blood sugars::  Rarely (fasting today at home was 112.)    Diabetic concerns::  None    Hypoglycemia symptoms::  None    Paraesthesia present::  No    Eye Exam in the last year::  Yes (this past winter)        Patient Active Problem List    Diagnosis Date Noted     Allergic rhinitis due to pollen 01/25/2018     Priority: Medium     Diabetes mellitus, type II (H) 01/25/2018     Priority: Medium     Overview:   Type 2 diabetes mellitus, insulin instituted 12/99, stopped agents after   gastric bypass 1/02.  Oral agents restarted 6/04 with increased weight.       Hyperlipidemia 01/25/2018     Priority: Medium     Hypertension 01/25/2018     Priority: Medium     Pain in joint, pelvic region and thigh 01/12/2011     Priority: Medium     Obesity 03/16/2010     Priority: Medium     Overview:   Height5'1  \"BMI09/07 - 38  Srhgdc82/22/09, 205Abd CircGreater than 35\"       Past Medical History:   Diagnosis Date     Basal cell carcinoma of skin of nose     2/25/2015     Personal history of other medical treatment (CODE)     G2, P2-0-0-2     Personal history of other medical treatment (CODE)     History of morbid obesity, weight loss on byetta     Type 2 diabetes mellitus without complications (H)     No Comments Provided      Past Surgical History:   Procedure Laterality Date     ARTHROSCOPY KNEE      2008     COLONOSCOPY      12/6/2006,10 year f/u     ENHANCE LASER REFRACTIVE NEW PT IN PARAMETERS      07/2000,LASIK refractive eye surgery 7/00     LAPAROSCOPIC BYPASS GASTRIC      Silvestre-en-Y gastric bypass with incidental cholecystectomy and " umbilical hernia repair     LAPAROSCOPIC CHOLECYSTECTOMY      No Comments Provided     OTHER SURGICAL HISTORY      ,HERNIA REPAIR, umbilical hernia repair     OTHER SURGICAL HISTORY      6/15/16,ILJ110,CATARACT EXTRACTION W/  INTRAOCULAR LENS IMPLANT,Left     RELEASE CARPAL TUNNEL      2000, 2000,Laparoscopic carpal tunnel release right hand, left hand     Family History   Problem Relation Age of Onset     CANCER Father      Cancer,lung cancer ,      HEART DISEASE Mother      Heart Disease,Coronary artery disease,      Colon Cancer Mother      Cancer-colon,bladder cancer, colon cancer, metastatic to liver, colon cancer     Breast Cancer Sister      Cancer-breast,metastatic breast cancer at age 63     CANCER Sister      Cancer,one  of uterine cancer age 48     CANCER Sister      Cancer,one  of bone cancer age 46.     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   - Georgi - metastatic pancreatic cancer,  2012.  Peña 72 son  Tsering 10/4/74 daughter  grandson - Barry     Current Outpatient Prescriptions   Medication Sig Dispense Refill     metoprolol tartrate (LOPRESSOR) 50 MG tablet Take 25 mg by mouth 2 times daily  3     triamterene-hydrochlorothiazide (MAXZIDE-25) 37.5-25 MG per tablet Take 0.5 tablets by mouth daily       aspirin EC 81 MG EC tablet Take 81 mg by mouth daily with food       blood glucose monitoring (NO BRAND SPECIFIED) test strip Dispense test strips covered by the patient  "insurance. Test 2 time per day.  ICD E11.9       Blood Glucose Monitoring Suppl (FIFTY50 GLUCOSE METER 2.0) W/DEVICE KIT Accu chek Lenora Plus meter.  E11.9 NIDDM type II - Test 1 time/day.       Blood Glucose Monitoring Suppl (GLUCOCOM BLOOD GLUCOSE MONITOR) NAVI Dispense glucose meter, test strips and lancets covered by the patient insurance. Test 1 time per day.  Diagnosis 250.0.       cyanocobalamin (VITAMIN B12) 1000 MCG/ML injection Inject 1 mL into the muscle every 30 days       IRON PO Take 325 mg by mouth daily with food       glucosamine sulfate 1000 MG CAPS Take 2,000 mg by mouth daily       blood glucose monitoring (SOFTCLIX) lancets daily       lisinopril (PRINIVIL/ZESTRIL) 2.5 MG tablet Take 2.5 mg by mouth daily       loratadine (CLARITIN) 10 MG tablet Take 10 mg by mouth daily       metFORMIN (GLUCOPHAGE) 1000 MG tablet Take 1,000 mg by mouth 2 times daily (with meals)       Multiple Vitamin (MULTI-VITAMINS) TABS Take 1 tablet by mouth daily       fish oil-omega-3 fatty acids 1000 MG capsule Take 1 capsule by mouth daily       simvastatin (ZOCOR) 10 MG tablet Take 10 mg by mouth daily       triamterene-hydrochlorothiazide (MAXZIDE-25) 37.5-25 MG per tablet Take 0.5 tablets by mouth daily       Allergies   Allergen Reactions     Cefaclor Rash       Review of Systems   Constitutional: Negative for fatigue and unexpected weight change.   Respiratory: Negative for cough.    Cardiovascular: Negative for chest pain.        OBJECTIVE:     /74 (BP Location: Right arm, Patient Position: Sitting, Cuff Size: Adult Regular)  Pulse 77  Ht 5' 1\" (1.549 m)  Wt 181 lb (82.1 kg)  BMI 34.2 kg/m2  Body mass index is 34.2 kg/(m^2).  Physical Exam   Constitutional: She appears well-developed.   HENT:   Head: Normocephalic.         PHQ-2 Score:     PHQ-2 ( 1999 Pfizer) 3/29/2018   Q1: Little interest or pleasure in doing things 0   Q2: Feeling down, depressed or hopeless 0   PHQ-2 Score 0         Diagnostic Test " Results:  Results for orders placed or performed in visit on 03/29/18 (from the past 24 hour(s))   **Comprehensive metabolic panel FUTURE anytime   Result Value Ref Range    Sodium 140 134 - 144 mmol/L    Potassium 3.8 3.5 - 5.1 mmol/L    Chloride 104 98 - 107 mmol/L    Carbon Dioxide 30 21 - 31 mmol/L    Anion Gap 6 3 - 14 mmol/L    Glucose 143 (H) 70 - 105 mg/dL    Urea Nitrogen 15 7 - 25 mg/dL    Creatinine 0.89 0.60 - 1.20 mg/dL    GFR Estimate 63 >60 mL/min/1.7m2    GFR Estimate If Black 76 >60 mL/min/1.7m2    Calcium 9.2 8.6 - 10.3 mg/dL    Bilirubin Total 0.4 0.3 - 1.0 mg/dL    Albumin 4.1 3.5 - 5.7 g/dL    Protein Total 6.5 6.4 - 8.9 g/dL    Alkaline Phosphatase 57 34 - 104 U/L    ALT 16 7 - 52 U/L    AST 16 13 - 39 U/L   Lipid Profile   Result Value Ref Range    Cholesterol 198 <200 mg/dL    Triglycerides 142 <150 mg/dL    HDL Cholesterol 84 23 - 92 mg/dL    LDL Cholesterol Calculated 86 <100 mg/dL    Non HDL Cholesterol 114 <130 mg/dL       ASSESSMENT/PLAN:       ICD-10-CM    1. Type 2 diabetes mellitus without complication, without long-term current use of insulin (H) E11.9 Lipid Profile     Hemoglobin A1c     Comprehensive metabolic panel     CANCELED: Hemoglobin A1c     CANCELED: Hemoglobin A1c   2. Pure hypercholesterolemia E78.00 Lipid Profile     Comprehensive metabolic panel   3. Essential hypertension I10 Comprehensive metabolic panel       1.  Unfortunately due to error in lab ordering process, her A1c was not run today and could not be added to labs already drawn.  Patient will be notified so that she can have it redrawn at a later date.  Blood pressure is well controlled. Eye exam is up to date.  On aspirin daily.  Non-smoker.  On ACE-I and statin.  2.  Lipids stable as above.  3.  Blood pressure in good range today as well.      Luz Levin MD  [facility] AND [facility]

## 2018-04-03 DIAGNOSIS — E11.9 TYPE 2 DIABETES MELLITUS WITHOUT COMPLICATION, WITHOUT LONG-TERM CURRENT USE OF INSULIN (H): ICD-10-CM

## 2018-04-03 LAB — HBA1C MFR BLD: 8 % (ref 4–6)

## 2018-04-03 PROCEDURE — 36415 COLL VENOUS BLD VENIPUNCTURE: CPT | Performed by: FAMILY MEDICINE

## 2018-04-03 PROCEDURE — 83036 HEMOGLOBIN GLYCOSYLATED A1C: CPT | Performed by: FAMILY MEDICINE

## 2018-04-16 ENCOUNTER — MYC MEDICAL ADVICE (OUTPATIENT)
Dept: FAMILY MEDICINE | Facility: OTHER | Age: 68
End: 2018-04-16

## 2018-04-16 DIAGNOSIS — E11.9 TYPE 2 DIABETES MELLITUS WITHOUT COMPLICATION, WITHOUT LONG-TERM CURRENT USE OF INSULIN (H): Primary | ICD-10-CM

## 2018-04-17 RX ORDER — PIOGLITAZONEHYDROCHLORIDE 15 MG/1
15 TABLET ORAL DAILY
Qty: 90 TABLET | Refills: 3 | Status: SHIPPED | OUTPATIENT
Start: 2018-04-17 | End: 2018-06-29

## 2018-05-19 DIAGNOSIS — J30.2 SEASONAL ALLERGIC RHINITIS, UNSPECIFIED CHRONICITY, UNSPECIFIED TRIGGER: Primary | ICD-10-CM

## 2018-05-22 RX ORDER — LORATADINE 10 MG/1
TABLET ORAL
Qty: 90 TABLET | Refills: 2 | Status: SHIPPED | OUTPATIENT
Start: 2018-05-22 | End: 2019-06-04

## 2018-06-29 ENCOUNTER — OFFICE VISIT (OUTPATIENT)
Dept: FAMILY MEDICINE | Facility: OTHER | Age: 68
End: 2018-06-29
Attending: FAMILY MEDICINE
Payer: MEDICARE

## 2018-06-29 VITALS
HEIGHT: 61 IN | SYSTOLIC BLOOD PRESSURE: 120 MMHG | HEART RATE: 72 BPM | WEIGHT: 181 LBS | BODY MASS INDEX: 34.17 KG/M2 | DIASTOLIC BLOOD PRESSURE: 80 MMHG

## 2018-06-29 DIAGNOSIS — E11.9 TYPE 2 DIABETES MELLITUS WITHOUT COMPLICATION, WITHOUT LONG-TERM CURRENT USE OF INSULIN (H): Primary | ICD-10-CM

## 2018-06-29 DIAGNOSIS — E78.00 PURE HYPERCHOLESTEROLEMIA: ICD-10-CM

## 2018-06-29 DIAGNOSIS — E11.9 TYPE 2 DIABETES MELLITUS WITHOUT COMPLICATION, WITHOUT LONG-TERM CURRENT USE OF INSULIN (H): ICD-10-CM

## 2018-06-29 DIAGNOSIS — I10 ESSENTIAL HYPERTENSION: ICD-10-CM

## 2018-06-29 LAB
ALBUMIN SERPL-MCNC: 4.2 G/DL (ref 3.5–5.7)
ALP SERPL-CCNC: 47 U/L (ref 34–104)
ALT SERPL W P-5'-P-CCNC: 15 U/L (ref 7–52)
ANION GAP SERPL CALCULATED.3IONS-SCNC: 6 MMOL/L (ref 3–14)
AST SERPL W P-5'-P-CCNC: 15 U/L (ref 13–39)
BILIRUB SERPL-MCNC: 0.4 MG/DL (ref 0.3–1)
BUN SERPL-MCNC: 20 MG/DL (ref 7–25)
CALCIUM SERPL-MCNC: 9.5 MG/DL (ref 8.6–10.3)
CHLORIDE SERPL-SCNC: 108 MMOL/L (ref 98–107)
CHOLEST SERPL-MCNC: 180 MG/DL
CO2 SERPL-SCNC: 30 MMOL/L (ref 21–31)
CREAT SERPL-MCNC: 0.92 MG/DL (ref 0.6–1.2)
GFR SERPL CREATININE-BSD FRML MDRD: 61 ML/MIN/1.7M2
GLUCOSE SERPL-MCNC: 125 MG/DL (ref 70–105)
HBA1C MFR BLD: 7.3 % (ref 4–6)
HDLC SERPL-MCNC: 82 MG/DL (ref 23–92)
LDLC SERPL CALC-MCNC: 76 MG/DL
NONHDLC SERPL-MCNC: 98 MG/DL
POTASSIUM SERPL-SCNC: 4.3 MMOL/L (ref 3.5–5.1)
PROT SERPL-MCNC: 6.7 G/DL (ref 6.4–8.9)
SODIUM SERPL-SCNC: 144 MMOL/L (ref 134–144)
TRIGL SERPL-MCNC: 108 MG/DL

## 2018-06-29 PROCEDURE — 36415 COLL VENOUS BLD VENIPUNCTURE: CPT | Performed by: FAMILY MEDICINE

## 2018-06-29 PROCEDURE — 80053 COMPREHEN METABOLIC PANEL: CPT | Performed by: FAMILY MEDICINE

## 2018-06-29 PROCEDURE — 83036 HEMOGLOBIN GLYCOSYLATED A1C: CPT | Performed by: FAMILY MEDICINE

## 2018-06-29 PROCEDURE — 99213 OFFICE O/P EST LOW 20 MIN: CPT | Performed by: FAMILY MEDICINE

## 2018-06-29 PROCEDURE — G0463 HOSPITAL OUTPT CLINIC VISIT: HCPCS

## 2018-06-29 PROCEDURE — 80061 LIPID PANEL: CPT | Performed by: FAMILY MEDICINE

## 2018-06-29 RX ORDER — PIOGLITAZONEHYDROCHLORIDE 30 MG/1
30 TABLET ORAL DAILY
Qty: 90 TABLET | Refills: 3 | Status: SHIPPED | OUTPATIENT
Start: 2018-06-29 | End: 2019-06-13

## 2018-06-29 RX ORDER — LANCETS
EACH MISCELLANEOUS
Qty: 100 EACH | Refills: 3 | Status: SHIPPED | OUTPATIENT
Start: 2018-06-29 | End: 2019-09-13

## 2018-06-29 RX ORDER — LISINOPRIL 2.5 MG/1
2.5 TABLET ORAL DAILY
Qty: 90 TABLET | Refills: 3 | Status: SHIPPED | OUTPATIENT
Start: 2018-06-29 | End: 2019-06-13

## 2018-06-29 ASSESSMENT — ENCOUNTER SYMPTOMS
COUGH: 0
FEVER: 0
FATIGUE: 0

## 2018-06-29 NOTE — MR AVS SNAPSHOT
"              After Visit Summary   6/29/2018    Karen Vargas    MRN: [id number]           Patient Information     Date Of Birth          1950        Visit Information        Provider Department      6/29/2018 9:15 AM Luz Levin MD [facility]        Today's Diagnoses     Type 2 diabetes mellitus without complication, without long-term current use of insulin (H)    -  1       Follow-ups after your visit        Who to contact     If you have questions or need follow up information about today's clinic visit or your schedule please contact [facility] AND [facility] directly at [phone].  Normal or non-critical lab and imaging results will be communicated to you by "[employer]hart, letter or phone within 4 business days after the clinic has received the results. If you do not hear from us within 7 days, please contact the clinic through VividWorkst or phone. If you have a critical or abnormal lab result, we will notify you by phone as soon as possible.  Submit refill requests through MedaNext or call your pharmacy and they will forward the refill request to us. Please allow 3 business days for your refill to be completed.          Additional Information About Your Visit        MyChart Information     MedaNext gives you secure access to your electronic health record. If you see a primary care provider, you can also send messages to your care team and make appointments. If you have questions, please call your primary care clinic.  If you do not have a primary care provider, please call [phone] and they will assist you.        Care EveryWhere ID     This is your Care EveryWhere ID. This could be used by other organizations to access your [place] medical records  [id number]        Your Vitals Were     Pulse Height BMI (Body Mass Index)             72 5' 1\" (1.549 m) 34.2 kg/m2          Blood Pressure from Last 3 Encounters:   06/29/18 120/80   03/29/18 124/74   12/29/17 120/80 "    Weight from Last 3 Encounters:   06/29/18 181 lb (82.1 kg)   03/29/18 181 lb (82.1 kg)   12/29/17 175 lb (79.4 kg)              Today, you had the following     No orders found for display         Today's Medication Changes          These changes are accurate as of 6/29/18  9:29 AM.  If you have any questions, ask your nurse or doctor.               These medicines have changed or have updated prescriptions.        Dose/Directions    blood glucose monitoring lancets   This may have changed:    - when to take this  - additional instructions   Used for:  Type 2 diabetes mellitus without complication, without long-term current use of insulin (H)   Changed by:  Luz Levin MD        Use to check blood sugars once daily and as needed.  Diagnosis:  E11.9.   Quantity:  100 each   Refills:  3       blood glucose monitoring test strip   Commonly known as:  no brand specified   This may have changed:  See the new instructions.   Used for:  Type 2 diabetes mellitus without complication, without long-term current use of insulin (H)   Changed by:  Luz Levin MD        Use to test blood sugar one time daily or as directed.   Quantity:  100 each   Refills:  3       pioglitazone 30 MG tablet   Commonly known as:  ACTOS   This may have changed:    - medication strength  - how much to take   Used for:  Type 2 diabetes mellitus without complication, without long-term current use of insulin (H)   Changed by:  Luz Levin MD        Dose:  30 mg   Take 1 tablet (30 mg) by mouth daily   Quantity:  90 tablet   Refills:  3            Where to get your medicines      These medications were sent to [place] [address] [facility] - [place], [place] - [address]  [address] [facility] [address]     Phone:  [phone]     blood glucose monitoring lancets    blood glucose monitoring test strip    lisinopril 2.5 MG tablet    metFORMIN 1000 MG tablet    pioglitazone 30 MG tablet                 Primary Care Provider Office Phone # Fax #    Luz Nevaeh Levin [phone] [address]   [place] [address]        Equal Access to Services     LENO JULIAN : Vianney santamaria wolfgang Yoon, watimboda luqadaha, qaybta kaalmada efren, kev palafoxmendez dowlingreuben bowers gwendolyn dhillon. So [facility] [phone].    ATENCIÓN: Si habla español, tiene a blackburn disposición servicios gratuitos de asistencia lingüística. Llame al [phone].    We comply with applicable federal civil rights laws and [place] laws. We do not discriminate on the basis of race, color, national origin, age, disability, sex, sexual orientation, or gender identity.            Thank you!     Thank you for choosing [facility]  for your care. Our goal is always to provide you with excellent care. Hearing back from our patients is one way we can continue to improve our services. Please take a few minutes to complete the written survey that you may receive in the mail after your visit with us. Thank you!             Your Updated Medication List - Protect others around you: Learn how to safely use, store and throw away your medicines at www.disposemymeds.org.          This list is accurate as of 6/29/18  9:29 AM.  Always use your most recent med list.                   Brand Name Dispense Instructions for use Diagnosis    aspirin 81 MG EC tablet      Take 81 mg by mouth daily with food        blood glucose monitoring lancets     100 each    Use to check blood sugars once daily and as needed.  Diagnosis:  E11.9.    Type 2 diabetes mellitus without complication, without long-term current use of insulin (H)       blood glucose monitoring test strip    no brand specified    100 each    Use to test blood sugar one time daily or as directed.    Type 2 diabetes mellitus without complication, without long-term current use of insulin (H)       cyanocobalamin 1000 MCG/ML injection    VITAMIN B12     Inject  1 mL into the muscle every 30 days        FIFTY50 GLUCOSE METER 2.0 w/Device Kit      Accu chek Lenora Plus meter.  E11.9 NIDDM type II - Test 1 time/day.        fish oil-omega-3 fatty acids 1000 MG capsule      Take 1 capsule by mouth daily        glucosamine sulfate 1000 MG Caps      Take 2,000 mg by mouth daily        IRON PO      Take 325 mg by mouth daily with food        lisinopril 2.5 MG tablet    PRINIVIL/Zestril    90 tablet    Take 1 tablet (2.5 mg) by mouth daily    Type 2 diabetes mellitus without complication, without long-term current use of insulin (H)       loratadine 10 MG tablet    CLARITIN    90 tablet    TAKE ONE TABLET BY MOUTH ONE TIME DAILY    Seasonal allergic rhinitis, unspecified chronicity, unspecified trigger       metFORMIN 1000 MG tablet    GLUCOPHAGE    180 tablet    Take 1 tablet (1,000 mg) by mouth 2 times daily (with meals)    Type 2 diabetes mellitus without complication, without long-term current use of insulin (H)       metoprolol tartrate 50 MG tablet    LOPRESSOR     Take 25 mg by mouth 2 times daily        MULTI-VITAMINS Tabs      Take 1 tablet by mouth daily        pioglitazone 30 MG tablet    ACTOS    90 tablet    Take 1 tablet (30 mg) by mouth daily    Type 2 diabetes mellitus without complication, without long-term current use of insulin (H)       simvastatin 10 MG tablet    ZOCOR     Take 10 mg by mouth daily        triamterene-hydrochlorothiazide 37.5-25 MG per tablet    MAXZIDE-25     Take 0.5 tablets by mouth daily

## 2018-06-29 NOTE — NURSING NOTE
Previous A1C is at goal of <8  Lab Results   Component Value Date    A1C 7.3 06/29/2018    A1C 8.0 04/03/2018     Urine microalbumin:creatine: 0.89  Foot exam last office visit   Eye exam 12/20/17    Tobacco User no  Patient is on a daily aspirin  Patient is on a Statin.  Blood pressure today of:     BP Readings from Last 1 Encounters:   03/29/18 124/74      is at the goal of <139/89 for diabetics.    Carolina Pierre LPN on 6/29/2018 at 8:32 AM

## 2018-06-29 NOTE — PROGRESS NOTES
"  SUBJECTIVE:   Nursing Notes:   Carolina Pierre LPN  6/29/2018  8:43 AM  Unsigned  Previous A1C is at goal of <8  Lab Results   Component Value Date    A1C 7.3 06/29/2018    A1C 8.0 04/03/2018     Urine microalbumin:creatine: 0.89  Foot exam last office visit   Eye exam 12/20/17    Tobacco User no  Patient is on a daily aspirin  Patient is on a Statin.  Blood pressure today of:     BP Readings from Last 1 Encounters:   03/29/18 124/74      is at the goal of <139/89 for diabetics.    Carolina Pierre LPN on 6/29/2018 at 8:32 AM        Karen Vargas is a 67 year old female who presents to clinic today for a diabetic check.    Diabetes     Diabetes:     Frequency of checking blood sugars::  1 time a day    Diabetic concerns::  None    Hypoglycemia symptoms::  None    Paraesthesia present::  No    Eye Exam in the last year::  Yes    12/20/17    Diabetes Management Resources  Diabetes comment:  Walking 2 miles almost every day.    Fasting blood sugars generally are 125-135 (overall range , 213 was a day she forgot her metformin)      I personally reviewed medications/allergies/history listed below:    Patient Active Problem List    Diagnosis Date Noted     Allergic rhinitis due to pollen 01/25/2018     Priority: Medium     Diabetes mellitus, type II (H) 01/25/2018     Priority: Medium     Overview:   Type 2 diabetes mellitus, insulin instituted 12/99, stopped agents after   gastric bypass 1/02.  Oral agents restarted 6/04 with increased weight.       Hyperlipidemia 01/25/2018     Priority: Medium     Hypertension 01/25/2018     Priority: Medium     Pain in joint, pelvic region and thigh 01/12/2011     Priority: Medium     Obesity 03/16/2010     Priority: Medium     Overview:   Height5'1  \"BMI09/07 - 38  Mpxsej77/22/09, 205Abd CircGreater than 35\"       Past Medical History:   Diagnosis Date     Basal cell carcinoma of skin of nose     2/25/2015     Personal history of other medical treatment (CODE)     G2, " P2-0-0-2     Personal history of other medical treatment (CODE)     History of morbid obesity, weight loss on byetta     Type 2 diabetes mellitus without complications (H)     No Comments Provided      Past Surgical History:   Procedure Laterality Date     ARTHROSCOPY KNEE      2008     COLONOSCOPY      2006,10 year f/u     ENHANCE LASER REFRACTIVE NEW PT IN PARAMETERS      2000,LASIK refractive eye surgery      LAPAROSCOPIC BYPASS GASTRIC      Silvestre-en-Y gastric bypass with incidental cholecystectomy and umbilical hernia repair     LAPAROSCOPIC CHOLECYSTECTOMY      No Comments Provided     OTHER SURGICAL HISTORY      ,HERNIA REPAIR, umbilical hernia repair     OTHER SURGICAL HISTORY      6/15/16,USY653,CATARACT EXTRACTION W/  INTRAOCULAR LENS IMPLANT,Left     RELEASE CARPAL TUNNEL      2000, 2000,Laparoscopic carpal tunnel release right hand, left hand     Family History   Problem Relation Age of Onset     Cancer Father      Cancer,lung cancer ,      HEART DISEASE Mother      Heart Disease,Coronary artery disease,      Colon Cancer Mother      Cancer-colon,bladder cancer, colon cancer, metastatic to liver, colon cancer     Breast Cancer Sister      Cancer-breast,metastatic breast cancer at age 63     Cancer Sister      Cancer,one  of uterine cancer age 48     Cancer Sister      Cancer,one  of bone cancer age 46.     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   - Georgi - metastatic  pancreatic cancer,  2012.  Peña 72 son  Tsering 10/4/74 daughter  grandson Nguyen Reddy     Current Outpatient Prescriptions   Medication Sig Dispense Refill     aspirin EC 81 MG EC tablet Take 81 mg by mouth daily with food       blood glucose monitoring (NO BRAND SPECIFIED) test strip Use to test blood sugar one time daily or as directed. 100 each 3     blood glucose monitoring (SOFTCLIX) lancets Use to check blood sugars once daily and as needed.  Diagnosis:  E11.9. 100 each 3     Blood Glucose Monitoring Suppl (FIFTY50 GLUCOSE METER 2.0) W/DEVICE KIT Accu chek Lenora Plus meter.  E11.9 NIDDM type II - Test 1 time/day.       cyanocobalamin (VITAMIN B12) 1000 MCG/ML injection Inject 1 mL into the muscle every 30 days       fish oil-omega-3 fatty acids 1000 MG capsule Take 1 capsule by mouth daily       glucosamine sulfate 1000 MG CAPS Take 2,000 mg by mouth daily       IRON PO Take 325 mg by mouth daily with food       lisinopril (PRINIVIL/ZESTRIL) 2.5 MG tablet Take 1 tablet (2.5 mg) by mouth daily 90 tablet 3     loratadine (CLARITIN) 10 MG tablet TAKE ONE TABLET BY MOUTH ONE TIME DAILY 90 tablet 2     metFORMIN (GLUCOPHAGE) 1000 MG tablet Take 1 tablet (1,000 mg) by mouth 2 times daily (with meals) 180 tablet 3     metoprolol tartrate (LOPRESSOR) 50 MG tablet Take 25 mg by mouth 2 times daily  3     Multiple Vitamin (MULTI-VITAMINS) TABS Take 1 tablet by mouth daily       pioglitazone (ACTOS) 30 MG tablet Take 1 tablet (30 mg) by mouth daily 90 tablet 3     simvastatin (ZOCOR) 10 MG tablet Take 10 mg by mouth daily       triamterene-hydrochlorothiazide (MAXZIDE-25) 37.5-25 MG per tablet Take 0.5 tablets by mouth daily       [DISCONTINUED] lisinopril (PRINIVIL/ZESTRIL) 2.5 MG tablet Take 2.5 mg by mouth daily       [DISCONTINUED] metFORMIN (GLUCOPHAGE) 1000 MG tablet Take 1,000 mg by mouth 2 times daily (with meals)       [DISCONTINUED] pioglitazone (ACTOS) 15 MG tablet Take 1 tablet (15 mg) by mouth daily  "90 tablet 3     [DISCONTINUED] triamterene-hydrochlorothiazide (MAXZIDE-25) 37.5-25 MG per tablet Take 0.5 tablets by mouth daily       Allergies   Allergen Reactions     Cefaclor Rash       Review of Systems   Constitutional: Negative for fatigue and fever.   Respiratory: Negative for cough.    Cardiovascular: Negative for chest pain and peripheral edema.   Psychiatric/Behavioral: Negative for mood changes.        OBJECTIVE:     /80 (BP Location: Right arm, Patient Position: Sitting, Cuff Size: Adult Regular)  Pulse 72  Ht 5' 1\" (1.549 m)  Wt 181 lb (82.1 kg)  BMI 34.2 kg/m2  Body mass index is 34.2 kg/(m^2).  Physical Exam   Constitutional: She is oriented to person, place, and time. She appears well-developed.   HENT:   Head: Normocephalic.   Eyes: Pupils are equal, round, and reactive to light.   Neck: Normal range of motion.   Cardiovascular: Normal rate, regular rhythm and normal heart sounds.    No murmur heard.  Pulmonary/Chest: Effort normal and breath sounds normal. No respiratory distress. She has no wheezes. She has no rales.   Musculoskeletal: She exhibits no edema.   Neurological: She is alert and oriented to person, place, and time.   Normal sensation with monofilament exam bilaterally.   Skin: Skin is warm.   No lesions on feet.   Psychiatric: She has a normal mood and affect.         PHQ-2 Score:     PHQ-2 ( 1999 Pfizer) 6/29/2018 3/29/2018   Q1: Little interest or pleasure in doing things 0 0   Q2: Feeling down, depressed or hopeless 0 0   PHQ-2 Score 0 0       I personally reviewed results withpatient as listed below:   Diagnostic Test Results:  Results for orders placed or performed in visit on 06/29/18   Lipid Profile   Result Value Ref Range    Cholesterol 180 <200 mg/dL    Triglycerides 108 <150 mg/dL    HDL Cholesterol 82 23 - 92 mg/dL    LDL Cholesterol Calculated 76 <100 mg/dL    Non HDL Cholesterol 98 <130 mg/dL   Hemoglobin A1c   Result Value Ref Range    Hemoglobin A1C 7.3 (H) " 4.0 - 6.0 %   Comprehensive metabolic panel   Result Value Ref Range    Sodium 144 134 - 144 mmol/L    Potassium 4.3 3.5 - 5.1 mmol/L    Chloride 108 (H) 98 - 107 mmol/L    Carbon Dioxide 30 21 - 31 mmol/L    Anion Gap 6 3 - 14 mmol/L    Glucose 125 (H) 70 - 105 mg/dL    Urea Nitrogen 20 7 - 25 mg/dL    Creatinine 0.92 0.60 - 1.20 mg/dL    GFR Estimate 61 >60 mL/min/1.7m2    GFR Estimate If Black 73 >60 mL/min/1.7m2    Calcium 9.5 8.6 - 10.3 mg/dL    Bilirubin Total 0.4 0.3 - 1.0 mg/dL    Albumin 4.2 3.5 - 5.7 g/dL    Protein Total 6.7 6.4 - 8.9 g/dL    Alkaline Phosphatase 47 34 - 104 U/L    ALT 15 7 - 52 U/L    AST 15 13 - 39 U/L        ASSESSMENT/PLAN:       ICD-10-CM    1. Type 2 diabetes mellitus without complication, without long-term current use of insulin (H) E11.9 blood glucose monitoring (NO BRAND SPECIFIED) test strip     blood glucose monitoring (SOFTCLIX) lancets     metFORMIN (GLUCOPHAGE) 1000 MG tablet     lisinopril (PRINIVIL/ZESTRIL) 2.5 MG tablet     pioglitazone (ACTOS) 30 MG tablet       1.  Stable.  A1c is slightly high, but improved.  Increase actos to 30 mg daily.  Blood pressure is well controlled. Eye exam is up to date.  On aspirin daily.  Non-smoker.  On ACE-I and statin.  Follow up in 3 months.  2.  Lipids in good range.  Continue on Simvastatin.  3.  Blood pressure stable.  On maxzide and lisinopril.    Luz Levin MD  [facility] [place]

## 2018-07-23 NOTE — PROGRESS NOTES
Patient Information     Patient Name  Karen Vargas MRN  [id number] Sex  Female   1950      Letter by Latrice Sow MD at      Author:  Latrice Sow MD Service:  (none) Author Type:  (none)    Filed:   Date of Service:   Status:  (Other)       [facility]  [address]  [place] [address]  [phone]         Karen Vargas   [address] [facility] [address]      2017  Date of Breast Imagin2017  9:34 AM    Dear Ms. Vargas:    Your recent breast imaging examination showed a finding that requires additional imaging studies for a complete evaluation. Most findings are benign (not cancer). Please call [phone] to schedule an appointment for these tests if you have not already done so.    A report of your results was sent to your health care provider(s).    Your images will become part of your medical file here at [facility] and will be available for your continuing care. You are responsible for informing any new health care provider or breast imaging facility of the date and location of this examination.    Although mammography is the most accurate method for early detection, not all cancers are found through mammography. If you notice any new changes in your breast(s) please inform your health care provider without delay.    Thank you for choosing [facility] to participate in your healthcare needs.       [facility] Recommendations for Early Breast Cancer Detection   in Women without Symptoms  When to start having mammograms to screen for breast cancer, and how often to have them, is a personal decision. It should be based on your preferences, your values and your risk for developing breast cancer. [facility] recommends that you and your health care provider together determine when mammograms are right for you.    [facility] recommends the following guidelines for  women who have an average risk for breast cancer, based on American Cancer Society guidelines:    Age 40 to 44: Mammograms are optional.     Age 45 to 54: Have a mammogram every year.    Age 55 and older: Have a mammogram every year, or transition to having one every 2 years. Continue to have mammograms as long as your health is good.    If you have a higher than average risk for breast cancer, your health care provider may recommend a different schedule.

## 2018-07-23 NOTE — PROGRESS NOTES
Patient Information     Patient Name  Karen Vargas MRN  [id number] Sex  Female   1950      Letter by Serina Watt MD at      Author:  Serina Watt MD Service:  (none) Author Type:  (none)    Filed:   Date of Service:   Status:  (Other)       [facility]  [address]  [place] [address]  [phone]         Karen Vargas   [address] [facility] [address]      2017  Date of Breast Imaging: 10/06/2017 10:41 AM    Dear Ms. Vargas:    Your recent breast imaging examination showed a finding that requires additional imaging studies for a complete evaluation. Most findings are benign (not cancer). Please call [phone] to schedule an appointment for these tests if you have not already done so.    A report of your results was sent to your health care provider(s).    Your images will become part of your medical file here at [facility] and will be available for your continuing care. You are responsible for informing any new health care provider or breast imaging facility of the date and location of this examination.    Although mammography is the most accurate method for early detection, not all cancers are found through mammography. If you notice any new changes in your breast(s) please inform your health care provider without delay.    Thank you for choosing [facility] to participate in your healthcare needs.       [facility] Recommendations for Early Breast Cancer Detection   in Women without Symptoms  When to start having mammograms to screen for breast cancer, and how often to have them, is a personal decision. It should be based on your preferences, your values and your risk for developing breast cancer. [facility] recommends that you and your health care provider together determine when mammograms are right for you.    [facility] recommends the following  guidelines for women who have an average risk for breast cancer, based on American Cancer Society guidelines:    Age 40 to 44: Mammograms are optional.     Age 45 to 54: Have a mammogram every year.    Age 55 and older: Have a mammogram every year, or transition to having one every 2 years. Continue to have mammograms as long as your health is good.    If you have a higher than average risk for breast cancer, your health care provider may recommend a different schedule.

## 2018-07-24 NOTE — PROGRESS NOTES
Patient Information     Patient Name  Karen Vargas MRN  [id number] Sex  Female   1950      Letter by Luz Levin MD at      Author:  Luz Levin MD Service:  (none) Author Type:  (none)    Filed:   Date of Service:   Status:  (Other)           Karen Vargas  [address]  [facility] [address]          2017    Dear Ms. Vargas:    You are receiving this letter because you recently had a bone density analysis.  This test is the only way your health care provider can determine if you have osteoporosis, which thins and weakens your bones and increases your risk for fractures.    Your results from this test are within the normal range for your age group.    To prevent loss of bone mass, the National [place] [employer] recommends the following daily amounts of calcium:    For men:    25 - 65, 1,000 mg    over  65, 1,500 mg    For women;    25 - 50, 1,000 mg    over 50 (postmenopausal), 1,500 mg    taking estrogen, 1,000 mg    not taking estrogen, 1,500 mg    You can get your daily-recommended amounts of calcium from dairy products and over the counter supplements.    If you have any questions about how often you should return for a bone density analysis, please discuss these questions with your health care provider.    Sincerely,    Luz Levin MD   [facility]

## 2018-08-12 DIAGNOSIS — E78.5 HYPERLIPIDEMIA LDL GOAL <100: Primary | ICD-10-CM

## 2018-08-13 RX ORDER — SIMVASTATIN 10 MG
TABLET ORAL
Qty: 90 TABLET | Refills: 3 | Status: SHIPPED | OUTPATIENT
Start: 2018-08-13 | End: 2019-08-08

## 2018-08-13 NOTE — TELEPHONE ENCOUNTER
This is a Refill request from:[facility]  Name of Medication and Dose:  Simvastatin 10 mg daily   Quantity requested:  90  Last fill date: 11/19/17  Last Office Visit:  06/29/18  PCP:  [facility]  Narcotic contract: unknown  Associated dx:     Carolina Pierre LPN ....................8/13/2018  1:20 PM

## 2018-09-25 ENCOUNTER — OFFICE VISIT (OUTPATIENT)
Dept: FAMILY MEDICINE | Facility: OTHER | Age: 68
End: 2018-09-25
Attending: FAMILY MEDICINE
Payer: MEDICARE

## 2018-09-25 VITALS
BODY MASS INDEX: 35.3 KG/M2 | DIASTOLIC BLOOD PRESSURE: 78 MMHG | RESPIRATION RATE: 20 BRPM | HEART RATE: 77 BPM | TEMPERATURE: 97.6 F | HEIGHT: 61 IN | SYSTOLIC BLOOD PRESSURE: 120 MMHG | WEIGHT: 187 LBS

## 2018-09-25 DIAGNOSIS — E11.9 TYPE 2 DIABETES MELLITUS WITHOUT COMPLICATION, WITHOUT LONG-TERM CURRENT USE OF INSULIN (H): Primary | ICD-10-CM

## 2018-09-25 DIAGNOSIS — I10 ESSENTIAL HYPERTENSION: ICD-10-CM

## 2018-09-25 DIAGNOSIS — E11.9 TYPE 2 DIABETES MELLITUS WITHOUT COMPLICATION, WITHOUT LONG-TERM CURRENT USE OF INSULIN (H): ICD-10-CM

## 2018-09-25 DIAGNOSIS — E78.00 PURE HYPERCHOLESTEROLEMIA: ICD-10-CM

## 2018-09-25 DIAGNOSIS — Z12.31 ENCOUNTER FOR SCREENING MAMMOGRAM FOR BREAST CANCER: ICD-10-CM

## 2018-09-25 LAB
ALBUMIN SERPL-MCNC: 4.2 G/DL (ref 3.5–5.7)
ALP SERPL-CCNC: 50 U/L (ref 34–104)
ALT SERPL W P-5'-P-CCNC: 17 U/L (ref 7–52)
ANION GAP SERPL CALCULATED.3IONS-SCNC: 9 MMOL/L (ref 3–14)
AST SERPL W P-5'-P-CCNC: 15 U/L (ref 13–39)
BILIRUB SERPL-MCNC: 0.4 MG/DL (ref 0.3–1)
BUN SERPL-MCNC: 18 MG/DL (ref 7–25)
CALCIUM SERPL-MCNC: 9.6 MG/DL (ref 8.6–10.3)
CHLORIDE SERPL-SCNC: 103 MMOL/L (ref 98–107)
CO2 SERPL-SCNC: 30 MMOL/L (ref 21–31)
CREAT SERPL-MCNC: 0.85 MG/DL (ref 0.6–1.2)
GFR SERPL CREATININE-BSD FRML MDRD: 66 ML/MIN/1.7M2
GLUCOSE SERPL-MCNC: 120 MG/DL (ref 70–105)
HBA1C MFR BLD: 6.8 % (ref 4–6)
POTASSIUM SERPL-SCNC: 3.9 MMOL/L (ref 3.5–5.1)
PROT SERPL-MCNC: 6.3 G/DL (ref 6.4–8.9)
SODIUM SERPL-SCNC: 142 MMOL/L (ref 134–144)

## 2018-09-25 PROCEDURE — 83036 HEMOGLOBIN GLYCOSYLATED A1C: CPT | Performed by: FAMILY MEDICINE

## 2018-09-25 PROCEDURE — 36415 COLL VENOUS BLD VENIPUNCTURE: CPT | Performed by: FAMILY MEDICINE

## 2018-09-25 PROCEDURE — 80053 COMPREHEN METABOLIC PANEL: CPT | Performed by: FAMILY MEDICINE

## 2018-09-25 PROCEDURE — G0463 HOSPITAL OUTPT CLINIC VISIT: HCPCS

## 2018-09-25 PROCEDURE — 99213 OFFICE O/P EST LOW 20 MIN: CPT | Performed by: FAMILY MEDICINE

## 2018-09-25 RX ORDER — TRIAMTERENE/HYDROCHLOROTHIAZID 37.5-25 MG
0.5 TABLET ORAL DAILY
Qty: 45 TABLET | Refills: 3 | Status: SHIPPED | OUTPATIENT
Start: 2018-09-25 | End: 2019-09-13

## 2018-09-25 RX ORDER — METOPROLOL TARTRATE 50 MG
25 TABLET ORAL 2 TIMES DAILY
Qty: 180 TABLET | Refills: 3 | Status: SHIPPED | OUTPATIENT
Start: 2018-09-25 | End: 2019-09-13

## 2018-09-25 ASSESSMENT — ENCOUNTER SYMPTOMS
NUMBNESS: 0
FATIGUE: 0
PARESTHESIAS: 0
COUGH: 0
FEVER: 0
UNEXPECTED WEIGHT CHANGE: 1

## 2018-09-25 NOTE — NURSING NOTE
"Chief Complaint   Patient presents with     Diabetes       Initial /78 (BP Location: Right arm, Patient Position: Sitting, Cuff Size: Adult Large)  Pulse 77  Temp 97.6  F (36.4  C) (Tympanic)  Resp 20  Ht 5' 1\" (1.549 m)  Wt 187 lb (84.8 kg)  Breastfeeding? No  BMI 35.33 kg/m2 Estimated body mass index is 35.33 kg/(m^2) as calculated from the following:    Height as of this encounter: 5' 1\" (1.549 m).    Weight as of this encounter: 187 lb (84.8 kg).  Medication Reconciliation: complete    Carolina Pierre LPN   Previous A1C is at goal of <8  Lab Results   Component Value Date    A1C 6.8 09/25/2018    A1C 7.3 06/29/2018    A1C 8.0 04/03/2018     Urine microalbumin:creatine: 0  Foot exam 06/29/18  Eye exam 12/20/17    Tobacco User no  Patient is on a daily aspirin  Patient is on a Statin.  Blood pressure today of:     BP Readings from Last 1 Encounters:   09/25/18 120/78      is at the goal of <139/89 for diabetics.    Carolina Pierre LPN on 9/25/2018 at 8:34 AM      "

## 2018-09-25 NOTE — PROGRESS NOTES
"  SUBJECTIVE:   Nursing Notes:   Carolina Pierre LPN  2018  8:34 AM  Unsigned  Chief Complaint   Patient presents with     Diabetes       Initial /78 (BP Location: Right arm, Patient Position: Sitting, Cuff Size: Adult Large)  Pulse 77  Temp 97.6  F (36.4  C) (Tympanic)  Resp 20  Ht 5' 1\" (1.549 m)  Wt 187 lb (84.8 kg)  Breastfeeding? No  BMI 35.33 kg/m2 Estimated body mass index is 35.33 kg/(m^2) as calculated from the following:    Height as of this encounter: 5' 1\" (1.549 m).    Weight as of this encounter: 187 lb (84.8 kg).  Medication Reconciliation: complete    Carolina Pierre LPN   Previous A1C is at goal of <8  Lab Results   Component Value Date    A1C 6.8 2018    A1C 7.3 2018    A1C 8.0 2018     Urine microalbumin:creatine: 0  Foot exam 18  Eye exam 17    Tobacco User no  Patient is on a daily aspirin  Patient is on a Statin.  Blood pressure today of:     BP Readings from Last 1 Encounters:   18 120/78      is at the goal of <139/89 for diabetics.    Carolina Pierre LPN on 2018 at 8:34 AM        Karen Vargas is a 68 year old female who presents to clinic today for a diabetic check.  She is still walking every day.  She has noted that her blood sugars seem to be better controlled since increasing the Actos to 30 mg daily.  However, she has gained a few pounds over the past 3 months, which she doesn't like.      Diabetes     Diabetes:     Frequency of checking blood sugars::  1 time a day    Diabetic concerns::  None    Hypoglycemia symptoms::  None    Paraesthesia present::  No    Eye Exam in the last year::  Yes    17    Diabetes Management Resources  Diabetes comment:  Fastins-100s      I personally reviewed medications/allergies/history listed below:    Patient Active Problem List    Diagnosis Date Noted     Allergic rhinitis due to pollen 2018     Priority: Medium     Diabetes mellitus, type II (H) 2018     " "Priority: Medium     Overview:   Type 2 diabetes mellitus, insulin instituted , stopped agents after   gastric bypass .  Oral agents restarted  with increased weight.       Hyperlipidemia 2018     Priority: Medium     Hypertension 2018     Priority: Medium     Pain in joint, pelvic region and thigh 2011     Priority: Medium     Obesity 2010     Priority: Medium     Overview:   Height5'1  \"BMI09/07 - 38  Kqhqyq50/, 205Abd CircGreater than 35\"       Past Medical History:   Diagnosis Date     Basal cell carcinoma of skin of nose     2015     Personal history of other medical treatment (CODE)     G2, P2-0-0-2     Personal history of other medical treatment (CODE)     History of morbid obesity, weight loss on byetta     Type 2 diabetes mellitus without complications (H)     No Comments Provided      Past Surgical History:   Procedure Laterality Date     ARTHROSCOPY KNEE           COLONOSCOPY      2006,10 year f/u     ENHANCE LASER REFRACTIVE NEW PT IN PARAMETERS      2000,LASIK refractive eye surgery      LAPAROSCOPIC BYPASS GASTRIC      Silvestre-en-Y gastric bypass with incidental cholecystectomy and umbilical hernia repair     LAPAROSCOPIC CHOLECYSTECTOMY      No Comments Provided     OTHER SURGICAL HISTORY      ,HERNIA REPAIR, umbilical hernia repair     OTHER SURGICAL HISTORY      6/15/16,RJB538,CATARACT EXTRACTION W/  INTRAOCULAR LENS IMPLANT,Left     RELEASE CARPAL TUNNEL      2000, 2000,Laparoscopic carpal tunnel release right hand, left hand     Family History   Problem Relation Age of Onset     Cancer Father      Cancer,lung cancer ,      HEART DISEASE Mother      Heart Disease,Coronary artery disease,      Colon Cancer Mother      Cancer-colon,bladder cancer, colon cancer, metastatic to liver, colon cancer     Breast Cancer Sister      Cancer-breast,metastatic breast cancer at age 63     Cancer Sister      Cancer,one  of " uterine cancer age 48     Cancer Sister      Cancer,one  of bone cancer age 46.     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   - Georgi - metastatic pancreatic cancer,  2012.  Peña 72 son  Tsering 10/4/74 daughter  grandson - Barry     Current Outpatient Prescriptions   Medication Sig Dispense Refill     aspirin EC 81 MG EC tablet Take 81 mg by mouth daily with food       blood glucose monitoring (NO BRAND SPECIFIED) test strip Use to test blood sugar one time daily or as directed. 100 each 3     blood glucose monitoring (SOFTCLIX) lancets Use to check blood sugars once daily and as needed.  Diagnosis:  E11.9. 100 each 3     Blood Glucose Monitoring Suppl (FIFTY50 GLUCOSE METER 2.0) W/DEVICE KIT Accu chek Lenora Plus meter.  E11.9 NIDDM type II - Test 1 time/day.       cyanocobalamin (VITAMIN B12) 1000 MCG/ML injection Inject 1 mL into the muscle every 30 days       fish oil-omega-3 fatty acids 1000 MG capsule Take 1 capsule by mouth daily       glucosamine sulfate 1000 MG CAPS Take 2,000 mg by mouth daily       IRON PO Take 325 mg by mouth daily with food       lisinopril (PRINIVIL/ZESTRIL) 2.5 MG tablet Take 1 tablet (2.5 mg) by mouth daily 90 tablet 3     loratadine (CLARITIN) 10 MG tablet TAKE ONE TABLET BY MOUTH ONE TIME DAILY 90 tablet 2     metFORMIN (GLUCOPHAGE) 1000 MG tablet Take 1 tablet (1,000 mg) by mouth 2 times daily (with meals) 180 tablet 3     metoprolol tartrate (LOPRESSOR) 50 MG tablet Take 25 mg by  "mouth 2 times daily  3     Multiple Vitamin (MULTI-VITAMINS) TABS Take 1 tablet by mouth daily       pioglitazone (ACTOS) 30 MG tablet Take 1 tablet (30 mg) by mouth daily 90 tablet 3     simvastatin (ZOCOR) 10 MG tablet TAKE 1 TABLET BY MOUTH ONCE DAILY. 90 tablet 3     triamterene-hydrochlorothiazide (MAXZIDE-25) 37.5-25 MG per tablet Take 0.5 tablets by mouth daily       Allergies   Allergen Reactions     Cefaclor Rash       Review of Systems   Constitutional: Positive for unexpected weight change. Negative for fatigue and fever.   Respiratory: Negative for cough.    Neurological: Negative for numbness and paresthesias.   Psychiatric/Behavioral: Negative for mood changes.        OBJECTIVE:     /78 (BP Location: Right arm, Patient Position: Sitting, Cuff Size: Adult Large)  Pulse 77  Temp 97.6  F (36.4  C) (Tympanic)  Resp 20  Ht 5' 1\" (1.549 m)  Wt 187 lb (84.8 kg)  Breastfeeding? No  BMI 35.33 kg/m2  Body mass index is 35.33 kg/(m^2).  Physical Exam   Constitutional: She is oriented to person, place, and time. She appears well-developed.   HENT:   Head: Normocephalic.   Eyes: Pupils are equal, round, and reactive to light.   Neck: Normal range of motion.   Cardiovascular: Normal rate, regular rhythm and normal heart sounds.    No murmur heard.  Pulmonary/Chest: Effort normal and breath sounds normal. No respiratory distress. She has no wheezes. She has no rales.   Musculoskeletal: She exhibits no edema.   Neurological: She is alert and oriented to person, place, and time.   Normal sensation with monofilament exam bilaterally.   Skin: Skin is warm.   No lesions on feet.   Psychiatric: She has a normal mood and affect.       PHQ-2 Score:     PHQ-2 ( 1999 Pfizer) 9/25/2018 6/29/2018   Q1: Little interest or pleasure in doing things 0 0   Q2: Feeling down, depressed or hopeless 0 0   PHQ-2 Score 0 0         I personally reviewed results withpatient as listed below:   Diagnostic Test Results:  Results " for orders placed or performed in visit on 09/25/18 (from the past 24 hour(s))   Hemoglobin A1c   Result Value Ref Range    Hemoglobin A1C 6.8 (H) 4.0 - 6.0 %   Comprehensive metabolic panel   Result Value Ref Range    Sodium 142 134 - 144 mmol/L    Potassium 3.9 3.5 - 5.1 mmol/L    Chloride 103 98 - 107 mmol/L    Carbon Dioxide 30 21 - 31 mmol/L    Anion Gap 9 3 - 14 mmol/L    Glucose 120 (H) 70 - 105 mg/dL    Urea Nitrogen 18 7 - 25 mg/dL    Creatinine 0.85 0.60 - 1.20 mg/dL    GFR Estimate 66 >60 mL/min/1.7m2    GFR Estimate If Black 80 >60 mL/min/1.7m2    Calcium 9.6 8.6 - 10.3 mg/dL    Bilirubin Total 0.4 0.3 - 1.0 mg/dL    Albumin 4.2 3.5 - 5.7 g/dL    Protein Total 6.3 (L) 6.4 - 8.9 g/dL    Alkaline Phosphatase 50 34 - 104 U/L    ALT 17 7 - 52 U/L    AST 15 13 - 39 U/L       ASSESSMENT/PLAN:       ICD-10-CM    1. Type 2 diabetes mellitus without complication, without long-term current use of insulin (H) E11.9    2. Essential hypertension I10 triamterene-hydrochlorothiazide (MAXZIDE-25) 37.5-25 MG per tablet     metoprolol tartrate (LOPRESSOR) 50 MG tablet   3. Pure hypercholesterolemia E78.00        1.  Stable.  A1c in good range.  Blood pressure is well controlled. Eye exam is up to date.  On aspirin daily.  Non-smoker.  On ACE-I and statin.  Follow up in 3 months.  2.  Blood pressure well controlled.  Medications refilled as noted above.  3.  Will plan to recheck lipids at next appointment in 3 months.      Luz Levin MD  [facility]

## 2018-09-25 NOTE — MR AVS SNAPSHOT
After Visit Summary   9/25/2018    Karen Vargas    MRN: [id number]           Patient Information     Date Of Birth          1950        Visit Information        Provider Department      9/25/2018 9:00 AM Luz Levin MD [facility]        Today's Diagnoses     Type 2 diabetes mellitus without complication, without long-term current use of insulin (H)    -  1    Essential hypertension        Pure hypercholesterolemia        Encounter for screening mammogram for breast cancer           Follow-ups after your visit        Future tests that were ordered for you today     Open Future Orders        Priority Expected Expires Ordered    MA Screen Bilateral w/Senthil Routine  9/25/2019 9/25/2018            Who to contact     If you have questions or need follow up information about today's clinic visit or your schedule please contact [facility] \[facility]\"" directly at [phone].  Normal or non-critical lab and imaging results will be communicated to you by [employer], letter or phone within 4 business days after the clinic has received the results. If you do not hear from us within 7 days, please contact the clinic through [employer] or phone. If you have a critical or abnormal lab result, we will notify you by phone as soon as possible.  Submit refill requests through [employer] or call your pharmacy and they will forward the refill request to us. Please allow 3 business days for your refill to be completed.          Additional Information About Your Visit        MyChart Information     [employer] gives you secure access to your electronic health record. If you see a primary care provider, you can also send messages to your care team and make appointments. If you have questions, please call your primary care clinic.  If you do not have a primary care provider, please call [phone] and they will assist you.        Care EveryWhere ID     This is your Care EveryWhere ID.  "This could be used by other organizations to access your [place] medical records  [id number]        Your Vitals Were     Pulse Temperature Respirations Height Breastfeeding? BMI (Body Mass Index)    77 97.6  F (36.4  C) (Tympanic) 20 5' 1\" (1.549 m) No 35.33 kg/m2       Blood Pressure from Last 3 Encounters:   09/25/18 120/78   06/29/18 120/80   03/29/18 124/74    Weight from Last 3 Encounters:   09/25/18 187 lb (84.8 kg)   06/29/18 181 lb (82.1 kg)   03/29/18 181 lb (82.1 kg)                 Where to get your medicines      These medications were sent to [facility] [id number] [facility] - [place], [place] - [address]  [address] [facility], located within [facility] [address]     Phone:  [phone]     metoprolol tartrate 50 MG tablet    triamterene-hydrochlorothiazide 37.5-25 MG per tablet          Primary Care Provider Office Phone # Fax #    Luz Nevaeh Levin [phone]       [address] [facility] [address]        Equal Access to Services     LENO JULIAN AH: Hadii collins reedo Sorobert, waaxda luqadaha, qaybta kaalmada adereubenyada, kev dhillon. So [facility] [phone].    ATENCIÓN: Si habla español, tiene a blackburn disposición servicios gratuitos de asistencia lingüística. Llame al [phone].    We comply with applicable federal civil rights laws and [place] laws. We do not discriminate on the basis of race, color, national origin, age, disability, sex, sexual orientation, or gender identity.            Thank you!     Thank you for choosing [facility]  for your care. Our goal is always to provide you with excellent care. Hearing back from our patients is one way we can continue to improve our services. Please take a few minutes to complete the written survey that you may receive in the mail after your visit with us. Thank you!             Your Updated Medication List - Protect others around you: Learn how to safely use, store and throw " away your medicines at www.disposemymeds.org.          This list is accurate as of 9/25/18  9:30 AM.  Always use your most recent med list.                   Brand Name Dispense Instructions for use Diagnosis    aspirin 81 MG EC tablet      Take 81 mg by mouth daily with food        blood glucose monitoring lancets     100 each    Use to check blood sugars once daily and as needed.  Diagnosis:  E11.9.    Type 2 diabetes mellitus without complication, without long-term current use of insulin (H)       blood glucose monitoring test strip    no brand specified    100 each    Use to test blood sugar one time daily or as directed.    Type 2 diabetes mellitus without complication, without long-term current use of insulin (H)       cyanocobalamin 1000 MCG/ML injection    VITAMIN B12     Inject 1 mL into the muscle every 30 days        FIFTY50 GLUCOSE METER 2.0 w/Device Kit      Accu chek Lenora Plus meter.  E11.9 NIDDM type II - Test 1 time/day.        fish oil-omega-3 fatty acids 1000 MG capsule      Take 1 capsule by mouth daily        glucosamine sulfate 1000 MG Caps      Take 2,000 mg by mouth daily        IRON PO      Take 325 mg by mouth daily with food        lisinopril 2.5 MG tablet    PRINIVIL/Zestril    90 tablet    Take 1 tablet (2.5 mg) by mouth daily    Type 2 diabetes mellitus without complication, without long-term current use of insulin (H)       loratadine 10 MG tablet    CLARITIN    90 tablet    TAKE ONE TABLET BY MOUTH ONE TIME DAILY    Seasonal allergic rhinitis, unspecified chronicity, unspecified trigger       metFORMIN 1000 MG tablet    GLUCOPHAGE    180 tablet    Take 1 tablet (1,000 mg) by mouth 2 times daily (with meals)    Type 2 diabetes mellitus without complication, without long-term current use of insulin (H)       metoprolol tartrate 50 MG tablet    LOPRESSOR    180 tablet    Take 0.5 tablets (25 mg) by mouth 2 times daily    Essential hypertension       MULTI-VITAMINS Tabs      Take 1 tablet  by mouth daily        pioglitazone 30 MG tablet    ACTOS    90 tablet    Take 1 tablet (30 mg) by mouth daily    Type 2 diabetes mellitus without complication, without long-term current use of insulin (H)       simvastatin 10 MG tablet    ZOCOR    90 tablet    TAKE 1 TABLET BY MOUTH ONCE DAILY.    Hyperlipidemia LDL goal <100       triamterene-hydrochlorothiazide 37.5-25 MG per tablet    MAXZIDE-25    45 tablet    Take 0.5 tablets by mouth daily    Essential hypertension

## 2018-10-09 ENCOUNTER — HOSPITAL ENCOUNTER (OUTPATIENT)
Dept: MAMMOGRAPHY | Facility: OTHER | Age: 68
Discharge: HOME | End: 2018-10-09
Attending: FAMILY MEDICINE | Admitting: FAMILY MEDICINE
Payer: MEDICARE

## 2018-10-09 DIAGNOSIS — Z12.31 ENCOUNTER FOR SCREENING MAMMOGRAM FOR BREAST CANCER: ICD-10-CM

## 2018-10-09 PROCEDURE — 77063 BREAST TOMOSYNTHESIS BI: CPT

## 2018-10-21 DIAGNOSIS — E53.8 VITAMIN B12 DEFICIENCY (NON ANEMIC): Primary | ICD-10-CM

## 2018-10-24 RX ORDER — CYANOCOBALAMIN 1000 UG/ML
INJECTION, SOLUTION INTRAMUSCULAR; SUBCUTANEOUS
Qty: 3 ML | Refills: 0 | Status: SHIPPED | OUTPATIENT
Start: 2018-10-24 | End: 2019-01-15

## 2018-10-24 NOTE — TELEPHONE ENCOUNTER
Cyanocobalamin  LOV-09/25/2018    Next 5 appointments (look out 90 days)     Dec 17, 2018  9:00 AM CST   Office Visit with Luz Levin MD   [facility] ([facility])    [address]  [place] [address]   [phone]                 Prescription refilled per RN Medication RefillPolicy.................... Kendra Romero ....................  10/24/2018   11:05 AM

## 2018-12-11 ENCOUNTER — TRANSFERRED RECORDS (OUTPATIENT)
Dept: HEALTH INFORMATION MANAGEMENT | Facility: OTHER | Age: 68
End: 2018-12-11

## 2018-12-11 LAB — HEMOCCULT STL QL IA: NEGATIVE

## 2018-12-17 ENCOUNTER — OFFICE VISIT (OUTPATIENT)
Dept: FAMILY MEDICINE | Facility: OTHER | Age: 68
End: 2018-12-17
Attending: FAMILY MEDICINE
Payer: MEDICARE

## 2018-12-17 VITALS
HEART RATE: 76 BPM | RESPIRATION RATE: 20 BRPM | BODY MASS INDEX: 35.5 KG/M2 | DIASTOLIC BLOOD PRESSURE: 70 MMHG | TEMPERATURE: 97.4 F | HEIGHT: 61 IN | WEIGHT: 188 LBS | SYSTOLIC BLOOD PRESSURE: 120 MMHG

## 2018-12-17 DIAGNOSIS — E11.9 TYPE 2 DIABETES MELLITUS WITHOUT COMPLICATION, WITHOUT LONG-TERM CURRENT USE OF INSULIN (H): Primary | ICD-10-CM

## 2018-12-17 DIAGNOSIS — E11.9 TYPE 2 DIABETES MELLITUS WITHOUT COMPLICATION, WITHOUT LONG-TERM CURRENT USE OF INSULIN (H): ICD-10-CM

## 2018-12-17 DIAGNOSIS — E78.00 PURE HYPERCHOLESTEROLEMIA: ICD-10-CM

## 2018-12-17 DIAGNOSIS — I10 ESSENTIAL HYPERTENSION: ICD-10-CM

## 2018-12-17 LAB
ALBUMIN SERPL-MCNC: 4.2 G/DL (ref 3.5–5.7)
ALP SERPL-CCNC: 56 U/L (ref 34–104)
ALT SERPL W P-5'-P-CCNC: 17 U/L (ref 7–52)
ANION GAP SERPL CALCULATED.3IONS-SCNC: 8 MMOL/L (ref 3–14)
AST SERPL W P-5'-P-CCNC: 16 U/L (ref 13–39)
BILIRUB SERPL-MCNC: 0.3 MG/DL (ref 0.3–1)
BUN SERPL-MCNC: 18 MG/DL (ref 7–25)
CALCIUM SERPL-MCNC: 9.7 MG/DL (ref 8.6–10.3)
CHLORIDE SERPL-SCNC: 104 MMOL/L (ref 98–107)
CHOLEST SERPL-MCNC: 183 MG/DL
CO2 SERPL-SCNC: 29 MMOL/L (ref 21–31)
CREAT SERPL-MCNC: 0.82 MG/DL (ref 0.6–1.2)
CREAT UR-MCNC: 71 MG/DL
GFR SERPL CREATININE-BSD FRML MDRD: 69 ML/MIN/1.7M2
GLUCOSE SERPL-MCNC: 149 MG/DL (ref 70–105)
HBA1C MFR BLD: 7.3 % (ref 4–6)
HDLC SERPL-MCNC: 83 MG/DL (ref 23–92)
LDLC SERPL CALC-MCNC: 71 MG/DL
MICROALBUMIN UR-MCNC: 9 MG/L
MICROALBUMIN/CREAT UR: 12.3 MG/G CR (ref 0–25)
NONHDLC SERPL-MCNC: 100 MG/DL
POTASSIUM SERPL-SCNC: 3.8 MMOL/L (ref 3.5–5.1)
PROT SERPL-MCNC: 6.7 G/DL (ref 6.4–8.9)
SODIUM SERPL-SCNC: 141 MMOL/L (ref 134–144)
TRIGL SERPL-MCNC: 143 MG/DL

## 2018-12-17 PROCEDURE — 36415 COLL VENOUS BLD VENIPUNCTURE: CPT | Performed by: FAMILY MEDICINE

## 2018-12-17 PROCEDURE — 83036 HEMOGLOBIN GLYCOSYLATED A1C: CPT | Performed by: FAMILY MEDICINE

## 2018-12-17 PROCEDURE — 82043 UR ALBUMIN QUANTITATIVE: CPT | Performed by: FAMILY MEDICINE

## 2018-12-17 PROCEDURE — 99213 OFFICE O/P EST LOW 20 MIN: CPT | Performed by: FAMILY MEDICINE

## 2018-12-17 PROCEDURE — G0463 HOSPITAL OUTPT CLINIC VISIT: HCPCS

## 2018-12-17 PROCEDURE — 80061 LIPID PANEL: CPT | Performed by: FAMILY MEDICINE

## 2018-12-17 PROCEDURE — 80053 COMPREHEN METABOLIC PANEL: CPT | Performed by: FAMILY MEDICINE

## 2018-12-17 ASSESSMENT — ENCOUNTER SYMPTOMS
FEVER: 0
FATIGUE: 0
COUGH: 0
PARESTHESIAS: 0
NUMBNESS: 0

## 2018-12-17 ASSESSMENT — PAIN SCALES - GENERAL: PAINLEVEL: NO PAIN (0)

## 2018-12-17 ASSESSMENT — MIFFLIN-ST. JEOR: SCORE: 1315.14

## 2018-12-17 NOTE — PROGRESS NOTES
"  SUBJECTIVE:   Nursing Notes:   Carolina Pierre LPN  12/17/2018  8:47 AM  Sign at exiting of workspace  Chief Complaint   Patient presents with     Diabetes     Previous A1C is at goal of <8  Lab Results   Component Value Date    A1C 6.8 09/25/2018    A1C 7.3 06/29/2018    A1C 8.0 04/03/2018     Urine microalbumin:creatine: 0  Foot exam 09/25/18  Eye exam Due in December, will go in January     Tobacco User no  Patient is on a daily aspirin  Patient is on a Statin.  Blood pressure today of:     BP Readings from Last 1 Encounters:   12/17/18 120/70      is at the goal of <139/89 for diabetics.    Carolina Pierre LPN on 12/17/2018 at 8:47 AM      Initial /70 (BP Location: Right arm, Patient Position: Sitting, Cuff Size: Adult Regular)   Pulse 76   Temp 97.4  F (36.3  C) (Tympanic)   Resp 20   Ht 1.549 m (5' 1\")   Wt 85.3 kg (188 lb)   BMI 35.52 kg/m    Estimated body mass index is 35.52 kg/m  as calculated from the following:    Height as of this encounter: 1.549 m (5' 1\").    Weight as of this encounter: 85.3 kg (188 lb).  Medication Reconciliation: complete    Carolina Pierre LPN    Karen Vargas is a 68 year old female who presents to clinic today for a diabetic check.    Diabetes concerns: none.    Patient glucose self monitoring: one time daily, once daily.   Blood glucose averages:  Usually in the 110s.  Symptoms of low blood sugar/hypoglycemia (nausea, shakey, sweaty, dizzy...)? none   Problems taking medications regularly? yes  Side effects? no    BP Readings from Last 3 Encounters:  12/17/18 : 120/70  09/25/18 : 120/78  06/29/18 : 120/80      Lab Results       Component                Value               Date                       A1C                      6.8                 09/25/2018                 A1C                      7.3                 06/29/2018                 A1C                      8.0                 04/03/2018              Lab Test        12/17/18 06/29/18             " "          0804          0804          CHOL         183          180           HDL          83           82            LDL          71           76            TRIG         143          108           NHDL         100          98              Wt Readings from Last 3 Encounters:  12/17/18 : 85.3 kg (188 lb)  09/25/18 : 84.8 kg (187 lb)  06/29/18 : 82.1 kg (181 lb)              I personally reviewed medications/allergies/history listed below:    Patient Active Problem List    Diagnosis Date Noted     Allergic rhinitis due to pollen 01/25/2018     Priority: Medium     Diabetes mellitus, type II (H) 01/25/2018     Priority: Medium     Overview:   Type 2 diabetes mellitus, insulin instituted 12/99, stopped agents after   gastric bypass 1/02.  Oral agents restarted 6/04 with increased weight.       Hyperlipidemia 01/25/2018     Priority: Medium     Hypertension 01/25/2018     Priority: Medium     Pain in joint, pelvic region and thigh 01/12/2011     Priority: Medium     Obesity 03/16/2010     Priority: Medium     Overview:   Height5'1  \"BMI09/07 - 38  Onaqvb69/22/09, 205Abd CircGreater than 35\"       Past Medical History:   Diagnosis Date     Basal cell carcinoma of skin of nose     2/25/2015     Personal history of other medical treatment (CODE)     G2, P2-0-0-2     Personal history of other medical treatment (CODE)     History of morbid obesity, weight loss on byetta     Type 2 diabetes mellitus without complications (H)     No Comments Provided      Past Surgical History:   Procedure Laterality Date     ARTHROSCOPY KNEE      2008     COLONOSCOPY      12/6/2006,10 year f/u     ENHANCE LASER REFRACTIVE NEW PT IN PARAMETERS      07/2000,LASIK refractive eye surgery 7/00     LAPAROSCOPIC BYPASS GASTRIC      Silvestre-en-Y gastric bypass with incidental cholecystectomy and umbilical hernia repair     LAPAROSCOPIC CHOLECYSTECTOMY      No Comments Provided     OTHER SURGICAL HISTORY      ,HERNIA REPAIR, umbilical hernia repair "     OTHER SURGICAL HISTORY      6/15/16,L[facility]675,CATARACT EXTRACTION W/  INTRAOCULAR LENS IMPLANT,Left     RELEASE CARPAL TUNNEL      2000, 2000,Laparoscopic carpal tunnel release right hand, left hand     Family History   Problem Relation Age of Onset     Cancer Father         Cancer,lung cancer ,      Heart Disease Mother         Heart Disease,Coronary artery disease,      Colon Cancer Mother         Cancer-colon,bladder cancer, colon cancer, metastatic to liver, colon cancer     Breast Cancer Sister         Cancer-breast,metastatic breast cancer at age 63     Cancer Sister         Cancer,one  of uterine cancer age 48     Cancer Sister         Cancer,one  of bone cancer age 46.     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   - Georgi - metastatic pancreatic cancer,  2012.  Peña 72 son  Tsering 10/4/74 daughter  grandson - Barry     Current Outpatient Medications   Medication Sig Dispense Refill     aspirin EC 81 MG EC tablet Take 81 mg by mouth daily with food       blood glucose monitoring (NO BRAND SPECIFIED) test strip Use to test blood sugar one time daily or as directed. 100 each 3     blood glucose monitoring (SOFTCLIX) lancets Use to check blood sugars once daily and as needed.  Diagnosis:  E11.9. 100 each 3     Blood Glucose Monitoring Suppl (FIFTY50 GLUCOSE METER 2.0) W/DEVICE KIT Accu  "celina Cooley Plus meter.  E11.9 NIDDM type II - Test 1 time/day.       cyanocobalamin (VITAMIN B12) 1000 MCG/ML injection INJECT 1ML INTRAMUSCULARLY EVERY 4 WEEKS. 3 mL 0     fish oil-omega-3 fatty acids 1000 MG capsule Take 1 capsule by mouth daily       glucosamine sulfate 1000 MG CAPS Take 2,000 mg by mouth daily       IRON PO Take 325 mg by mouth daily with food       lisinopril (PRINIVIL/ZESTRIL) 2.5 MG tablet Take 1 tablet (2.5 mg) by mouth daily 90 tablet 3     loratadine (CLARITIN) 10 MG tablet TAKE ONE TABLET BY MOUTH ONE TIME DAILY 90 tablet 2     metFORMIN (GLUCOPHAGE) 1000 MG tablet Take 1 tablet (1,000 mg) by mouth 2 times daily (with meals) 180 tablet 3     metoprolol tartrate (LOPRESSOR) 50 MG tablet Take 0.5 tablets (25 mg) by mouth 2 times daily 180 tablet 3     Multiple Vitamin (MULTI-VITAMINS) TABS Take 1 tablet by mouth daily       pioglitazone (ACTOS) 30 MG tablet Take 1 tablet (30 mg) by mouth daily 90 tablet 3     simvastatin (ZOCOR) 10 MG tablet TAKE 1 TABLET BY MOUTH ONCE DAILY. 90 tablet 3     triamterene-hydrochlorothiazide (MAXZIDE-25) 37.5-25 MG per tablet Take 0.5 tablets by mouth daily 45 tablet 3     Allergies   Allergen Reactions     Cefaclor Rash       Review of Systems   Constitutional: Negative for fatigue and fever.   Respiratory: Negative for cough.    Neurological: Negative for numbness and paresthesias.   Psychiatric/Behavioral: Negative for mood changes.        OBJECTIVE:     /70 (BP Location: Right arm, Patient Position: Sitting, Cuff Size: Adult Regular)   Pulse 76   Temp 97.4  F (36.3  C) (Tympanic)   Resp 20   Ht 1.549 m (5' 1\")   Wt 85.3 kg (188 lb)   BMI 35.52 kg/m    Body mass index is 35.52 kg/m .  Physical Exam   Constitutional: She is oriented to person, place, and time. She appears well-developed.   HENT:   Head: Normocephalic.   Eyes: Pupils are equal, round, and reactive to light.   Neck: Normal range of motion.   Cardiovascular: Normal rate, regular " rhythm and normal heart sounds.   No murmur heard.  Pulmonary/Chest: Effort normal and breath sounds normal. No respiratory distress. She has no wheezes. She has no rales.   Musculoskeletal: She exhibits no edema.   Neurological: She is alert and oriented to person, place, and time.   Normal sensation with monofilament exam bilaterally.   Skin: Skin is warm.   No lesions on feet.   Psychiatric: She has a normal mood and affect.         PHQ-2 Score:     PHQ-2 ( 1999 Pfizer) 12/17/2018 9/25/2018   Q1: Little interest or pleasure in doing things 0 0   Q2: Feeling down, depressed or hopeless 0 0   PHQ-2 Score 0 0         I personally reviewed results withpatient as listed below:   Diagnostic Test Results:  Results for orders placed or performed in visit on 12/17/18   Comprehensive metabolic panel   Result Value Ref Range    Sodium 141 134 - 144 mmol/L    Potassium 3.8 3.5 - 5.1 mmol/L    Chloride 104 98 - 107 mmol/L    Carbon Dioxide 29 21 - 31 mmol/L    Anion Gap 8 3 - 14 mmol/L    Glucose 149 (H) 70 - 105 mg/dL    Urea Nitrogen 18 7 - 25 mg/dL    Creatinine 0.82 0.60 - 1.20 mg/dL    GFR Estimate 69 >60 mL/min/1.7m2    GFR Estimate If Black 84 >60 mL/min/1.7m2    Calcium 9.7 8.6 - 10.3 mg/dL    Bilirubin Total 0.3 0.3 - 1.0 mg/dL    Albumin 4.2 3.5 - 5.7 g/dL    Protein Total 6.7 6.4 - 8.9 g/dL    Alkaline Phosphatase 56 34 - 104 U/L    ALT 17 7 - 52 U/L    AST 16 13 - 39 U/L   Hemoglobin A1c   Result Value Ref Range    Hemoglobin A1C 7.3 (H) 4.0 - 6.0 %   Lipid Profile   Result Value Ref Range    Cholesterol 183 <200 mg/dL    Triglycerides 143 <150 mg/dL    HDL Cholesterol 83 23 - 92 mg/dL    LDL Cholesterol Calculated 71 <100 mg/dL    Non HDL Cholesterol 100 <130 mg/dL       ASSESSMENT/PLAN:       ICD-10-CM    1. Type 2 diabetes mellitus without complication, without long-term current use of insulin (H) E11.9 Thyrotropin GH     Comprehensive Metabolic Panel     Lipid Panel     Hemoglobin A1c   2. Pure  hypercholesterolemia E78.00 Comprehensive Metabolic Panel     Lipid Panel   3. Essential hypertension I10 Comprehensive Metabolic Panel       1.  Not ideally controlled.  A1c is slightly high.  She would like to try watching her diet more closely before making any medication changes.  Blood pressure is well controlled. Eye exam is up to date.  On aspirin daily.  Non-smoker.  On ACE-I and statin.  Follow up in 3 months.  2.  Lipids in good range as noted above.  On Simvastatin.  3.  Blood pressure well controlled as noted above.      Luz Levin MD  [facility] AND [facility]

## 2018-12-17 NOTE — NURSING NOTE
"Chief Complaint   Patient presents with     Diabetes     Previous A1C is at goal of <8  Lab Results   Component Value Date    A1C 6.8 09/25/2018    A1C 7.3 06/29/2018    A1C 8.0 04/03/2018     Urine microalbumin:creatine: 0  Foot exam 09/25/18  Eye exam will go in January     Tobacco User no  Patient is on a daily aspirin  Patient is on a Statin.  Blood pressure today of:     BP Readings from Last 1 Encounters:   12/17/18 120/70      is at the goal of <139/89 for diabetics.    Carolina Pierre LPN on 12/17/2018 at 8:47 AM      Initial /70 (BP Location: Right arm, Patient Position: Sitting, Cuff Size: Adult Regular)   Pulse 76   Temp 97.4  F (36.3  C) (Tympanic)   Resp 20   Ht 1.549 m (5' 1\")   Wt 85.3 kg (188 lb)   BMI 35.52 kg/m   Estimated body mass index is 35.52 kg/m  as calculated from the following:    Height as of this encounter: 1.549 m (5' 1\").    Weight as of this encounter: 85.3 kg (188 lb).  Medication Reconciliation: complete    Carolina Pierre LPN  "

## 2019-01-09 ENCOUNTER — TRANSFERRED RECORDS (OUTPATIENT)
Dept: HEALTH INFORMATION MANAGEMENT | Facility: OTHER | Age: 69
End: 2019-01-09

## 2019-01-15 DIAGNOSIS — E53.8 VITAMIN B12 DEFICIENCY (NON ANEMIC): ICD-10-CM

## 2019-01-15 RX ORDER — CYANOCOBALAMIN 1000 UG/ML
INJECTION, SOLUTION INTRAMUSCULAR; SUBCUTANEOUS
Qty: 3 ML | Refills: 3 | Status: SHIPPED | OUTPATIENT
Start: 2019-01-15 | End: 2020-04-10

## 2019-01-15 NOTE — TELEPHONE ENCOUNTER
"Requested Prescriptions   Pending Prescriptions Disp Refills     vitamin B-12 (CYANOCOBALAMIN) 1000 MCG/ML injection [Pharmacy Med Name: CYANOCOBALAMIN 1,000 MCG/ML] 3 mL 0     Sig: INJECT 1ML INTRAMUSCULARLY EVERY 4 WEEKS.    Vitamin Supplements (Adult) Protocol Passed - 1/15/2019  1:30 AM       Passed - High dose Vitamin D not ordered       Passed - Recent (12 mo) or future (30 days) visit within the authorizing provider's specialty    Patient had office visit in the last 12 months or has a visit in the next 30 days with authorizing provider or within the authorizing provider's specialty.  See \"Patient Info\" tab in inbasket, or \"Choose Columns\" in Meds & Orders section of the refill encounter.             Passed - Medication is active on med list        LOV-12/17/2018  Future Office visit:    Next 5 appointments (look out 90 days)    Mar 15, 2019  9:00 AM CDT  Office Visit with Luz Levin MD  [facility] ([facility]) [address]  [place] [address]  [phone]           Prescription refilled per RN Medication RefillPolicy.................... Kendra Romero ....................  1/15/2019   3:58 PM        "

## 2019-03-15 ENCOUNTER — OFFICE VISIT (OUTPATIENT)
Dept: FAMILY MEDICINE | Facility: OTHER | Age: 69
End: 2019-03-15
Attending: FAMILY MEDICINE
Payer: MEDICARE

## 2019-03-15 VITALS
DIASTOLIC BLOOD PRESSURE: 80 MMHG | TEMPERATURE: 98.6 F | BODY MASS INDEX: 36.06 KG/M2 | HEART RATE: 74 BPM | WEIGHT: 191 LBS | RESPIRATION RATE: 20 BRPM | HEIGHT: 61 IN | SYSTOLIC BLOOD PRESSURE: 116 MMHG

## 2019-03-15 DIAGNOSIS — I10 ESSENTIAL HYPERTENSION: ICD-10-CM

## 2019-03-15 DIAGNOSIS — E11.9 TYPE 2 DIABETES MELLITUS WITHOUT COMPLICATION, WITHOUT LONG-TERM CURRENT USE OF INSULIN (H): ICD-10-CM

## 2019-03-15 DIAGNOSIS — E11.9 TYPE 2 DIABETES MELLITUS WITHOUT COMPLICATION, WITHOUT LONG-TERM CURRENT USE OF INSULIN (H): Primary | ICD-10-CM

## 2019-03-15 DIAGNOSIS — E78.00 PURE HYPERCHOLESTEROLEMIA: ICD-10-CM

## 2019-03-15 PROBLEM — H25.13 AGE-RELATED NUCLEAR CATARACT, BILATERAL: Status: ACTIVE | Noted: 2019-03-15

## 2019-03-15 PROBLEM — H10.503 BLEPHAROCONJUNCTIVITIS OF BOTH EYES: Status: ACTIVE | Noted: 2019-03-15

## 2019-03-15 PROBLEM — Z98.49 CATARACT EXTRACTION STATUS: Status: ACTIVE | Noted: 2019-03-15

## 2019-03-15 PROBLEM — Z98.890 OTHER SPECIFIED POSTPROCEDURAL STATES: Status: ACTIVE | Noted: 2019-03-15

## 2019-03-15 LAB
ALBUMIN SERPL-MCNC: 4.2 G/DL (ref 3.5–5.7)
ALP SERPL-CCNC: 55 U/L (ref 34–104)
ALT SERPL W P-5'-P-CCNC: 15 U/L (ref 7–52)
ANION GAP SERPL CALCULATED.3IONS-SCNC: 6 MMOL/L (ref 3–14)
AST SERPL W P-5'-P-CCNC: 14 U/L (ref 13–39)
BILIRUB SERPL-MCNC: 0.4 MG/DL (ref 0.3–1)
BUN SERPL-MCNC: 17 MG/DL (ref 7–25)
CALCIUM SERPL-MCNC: 9.4 MG/DL (ref 8.6–10.3)
CHLORIDE SERPL-SCNC: 105 MMOL/L (ref 98–107)
CHOLEST SERPL-MCNC: 200 MG/DL
CO2 SERPL-SCNC: 31 MMOL/L (ref 21–31)
CREAT SERPL-MCNC: 0.96 MG/DL (ref 0.6–1.2)
GFR SERPL CREATININE-BSD FRML MDRD: 58 ML/MIN/{1.73_M2}
GLUCOSE SERPL-MCNC: 156 MG/DL (ref 70–105)
HBA1C MFR BLD: 8.5 % (ref 4–6)
HDLC SERPL-MCNC: 83 MG/DL (ref 23–92)
LDLC SERPL CALC-MCNC: 88 MG/DL
NONHDLC SERPL-MCNC: 117 MG/DL
POTASSIUM SERPL-SCNC: 4.3 MMOL/L (ref 3.5–5.1)
PROT SERPL-MCNC: 6.7 G/DL (ref 6.4–8.9)
SODIUM SERPL-SCNC: 142 MMOL/L (ref 134–144)
TRIGL SERPL-MCNC: 146 MG/DL
TSH SERPL DL<=0.05 MIU/L-ACNC: 4.52 IU/ML (ref 0.34–5.6)

## 2019-03-15 PROCEDURE — 83036 HEMOGLOBIN GLYCOSYLATED A1C: CPT | Performed by: FAMILY MEDICINE

## 2019-03-15 PROCEDURE — 84443 ASSAY THYROID STIM HORMONE: CPT | Performed by: FAMILY MEDICINE

## 2019-03-15 PROCEDURE — G0463 HOSPITAL OUTPT CLINIC VISIT: HCPCS

## 2019-03-15 PROCEDURE — 80061 LIPID PANEL: CPT | Performed by: FAMILY MEDICINE

## 2019-03-15 PROCEDURE — 36415 COLL VENOUS BLD VENIPUNCTURE: CPT | Performed by: FAMILY MEDICINE

## 2019-03-15 PROCEDURE — 80053 COMPREHEN METABOLIC PANEL: CPT | Performed by: FAMILY MEDICINE

## 2019-03-15 PROCEDURE — 99213 OFFICE O/P EST LOW 20 MIN: CPT | Performed by: FAMILY MEDICINE

## 2019-03-15 ASSESSMENT — ENCOUNTER SYMPTOMS
SHORTNESS OF BREATH: 0
WOUND: 0
FEVER: 0
COUGH: 0

## 2019-03-15 ASSESSMENT — MIFFLIN-ST. JEOR: SCORE: 1328.75

## 2019-03-15 ASSESSMENT — PAIN SCALES - GENERAL: PAINLEVEL: NO PAIN (0)

## 2019-03-15 NOTE — PROGRESS NOTES
"  SUBJECTIVE:   Nursing Notes:   Carolina Pierre LPN  3/15/2019  8:16 AM  Sign at exiting of workspace  Chief Complaint   Patient presents with     Diabetes     Previous A1C is not at goal of <8  Lab Results   Component Value Date    A1C 8.5 03/15/2019    A1C 7.3 12/17/2018    A1C 6.8 09/25/2018    A1C 7.3 06/29/2018    A1C 8.0 04/03/2018     Urine microalbumin:creatine: 0  Foot exam 12/17/18  Eye exam 02/18/19    Tobacco User no  Patient is on a daily aspirin  Patient is on a Statin.  Blood pressure today of:     BP Readings from Last 1 Encounters:   03/15/19 116/80      is at the goal of <139/89 for diabetics.    Carolina Pierre LPN on 3/15/2019 at 8:16 AM      Initial /80 (BP Location: Right arm, Patient Position: Sitting, Cuff Size: Adult Large)   Pulse 74   Temp 98.6  F (37  C) (Tympanic)   Resp 20   Ht 1.549 m (5' 1\")   Wt 86.6 kg (191 lb)   BMI 36.09 kg/m    Estimated body mass index is 36.09 kg/m  as calculated from the following:    Height as of this encounter: 1.549 m (5' 1\").    Weight as of this encounter: 86.6 kg (191 lb).  Medication Reconciliation: complete    Carolina Pierre LPN    Karen Vargas is a 68 year old female who presents to clinic today for a diabetic check.  Still walking nearly every day.  Still trying to be careful of what she is eating.    Diabetes     Diabetes:     Frequency of checking blood sugars::  1 time a day    Diabetic concerns::  None    Hypoglycemia symptoms::  None    Paraesthesia present::  No    Eye Exam in the last year::  Yes    2/18/19    Diabetes Management Resources      I personally reviewed medications/allergies/history listed below:    Patient Active Problem List    Diagnosis Date Noted     Blepharoconjunctivitis of both eyes 03/15/2019     Priority: Medium     Cataract extraction status 03/15/2019     Priority: Medium     Age-related nuclear cataract, bilateral 03/15/2019     Priority: Medium     Other specified postprocedural states " "03/15/2019     Priority: Medium     Allergic rhinitis due to pollen 2018     Priority: Medium     Diabetes mellitus, type II (H) 2018     Priority: Medium     Overview:   Type 2 diabetes mellitus, insulin instituted , stopped agents after   gastric bypass .  Oral agents restarted  with increased weight.       Hyperlipidemia 2018     Priority: Medium     Hypertension 2018     Priority: Medium     Pain in joint, pelvic region and thigh 2011     Priority: Medium     Obesity 2010     Priority: Medium     Overview:   Height5'1  \"BMI09/07 - 38  Xibjrw09/, 205Abd CircGreater than 35\"       Past Medical History:   Diagnosis Date     Basal cell carcinoma of skin of nose     2015     Colon cancer screening 2018    FIT test neg     Personal history of other medical treatment (CODE)     G2, P2-0-0-2     Personal history of other medical treatment (CODE)     History of morbid obesity, weight loss on byetta     Type 2 diabetes mellitus without complications (H)     No Comments Provided      Past Surgical History:   Procedure Laterality Date     ARTHROSCOPY KNEE           COLONOSCOPY      2006,10 year f/u     ENHANCE LASER REFRACTIVE NEW PT IN PARAMETERS      2000,LASIK refractive eye surgery      LAPAROSCOPIC BYPASS GASTRIC      Silvestre-en-Y gastric bypass with incidental cholecystectomy and umbilical hernia repair     LAPAROSCOPIC CHOLECYSTECTOMY      No Comments Provided     OTHER SURGICAL HISTORY      ,HERNIA REPAIR, umbilical hernia repair     OTHER SURGICAL HISTORY      6/15/16,[id number],CATARACT EXTRACTION W/  INTRAOCULAR LENS IMPLANT,Left     RELEASE CARPAL TUNNEL      2000, 2000,Laparoscopic carpal tunnel release right hand, left hand     Family History   Problem Relation Age of Onset     Cancer Father         Cancer,lung cancer ,      Heart Disease Mother         Heart Disease,Coronary artery disease,      Colon Cancer " Mother         Cancer-colon,bladder cancer, colon cancer, metastatic to liver, colon cancer     Breast Cancer Sister         Cancer-breast,metastatic breast cancer at age 63     Cancer Sister         Cancer,one  of uterine cancer age 48     Cancer Sister         Cancer,one  of bone cancer age 46.     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   - Georgi - metastatic pancreatic cancer,  2012.  Peña 72 son  Tsering 10/4/74 daughter  grandson - Barry     Current Outpatient Medications   Medication Sig Dispense Refill     aspirin EC 81 MG EC tablet Take 81 mg by mouth daily with food       blood glucose monitoring (NO BRAND SPECIFIED) test strip Use to test blood sugar one time daily or as directed. 100 each 3     blood glucose monitoring (SOFTCLIX) lancets Use to check blood sugars once daily and as needed.  Diagnosis:  E11.9. 100 each 3     Blood Glucose Monitoring Suppl (FIFTY50 GLUCOSE METER 2.0) W/DEVICE KIT Accu chek Lenora Plus meter.  E11.9 NIDDM type II - Test 1 time/day.       fish oil-omega-3 fatty acids 1000 MG capsule Take 1 capsule by mouth daily       glucosamine sulfate 1000 MG CAPS Take 2,000 mg by mouth daily       IRON PO Take 325 mg by mouth daily with food       lisinopril (PRINIVIL/ZESTRIL) 2.5 MG tablet Take 1 tablet (2.5 mg) by mouth daily 90 tablet 3     loratadine (CLARITIN) 10 MG tablet TAKE ONE  "TABLET BY MOUTH ONE TIME DAILY 90 tablet 2     metFORMIN (GLUCOPHAGE) 1000 MG tablet Take 1 tablet (1,000 mg) by mouth 2 times daily (with meals) 180 tablet 3     metoprolol tartrate (LOPRESSOR) 50 MG tablet Take 0.5 tablets (25 mg) by mouth 2 times daily 180 tablet 3     Multiple Vitamin (MULTI-VITAMINS) TABS Take 1 tablet by mouth daily       pioglitazone (ACTOS) 30 MG tablet Take 1 tablet (30 mg) by mouth daily 90 tablet 3     simvastatin (ZOCOR) 10 MG tablet TAKE 1 TABLET BY MOUTH ONCE DAILY. 90 tablet 3     triamterene-hydrochlorothiazide (MAXZIDE-25) 37.5-25 MG per tablet Take 0.5 tablets by mouth daily 45 tablet 3     vitamin B-12 (CYANOCOBALAMIN) 1000 MCG/ML injection INJECT 1ML INTRAMUSCULARLY EVERY 4 WEEKS. 3 mL 3     Allergies   Allergen Reactions     Cefaclor Rash       Review of Systems   Constitutional: Negative for fever.   Respiratory: Negative for cough and shortness of breath.    Cardiovascular: Negative for chest pain.   Skin: Negative for rash and wound.   Psychiatric/Behavioral: Negative for mood changes.        OBJECTIVE:     /80 (BP Location: Right arm, Patient Position: Sitting, Cuff Size: Adult Large)   Pulse 74   Temp 98.6  F (37  C) (Tympanic)   Resp 20   Ht 1.549 m (5' 1\")   Wt 86.6 kg (191 lb)   BMI 36.09 kg/m    Body mass index is 36.09 kg/m .  Physical Exam   Constitutional: She is oriented to person, place, and time. She appears well-developed.   HENT:   Head: Normocephalic.   Eyes: Pupils are equal, round, and reactive to light.   Neck: Normal range of motion.   Cardiovascular: Normal rate, regular rhythm and normal heart sounds.   No murmur heard.  Pulmonary/Chest: Effort normal and breath sounds normal. No respiratory distress. She has no wheezes. She has no rales.   Musculoskeletal: She exhibits no edema.   Neurological: She is alert and oriented to person, place, and time.   Normal sensation with monofilament exam bilaterally.   Skin: Skin is warm.   No lesions on " feet.   Psychiatric: She has a normal mood and affect.         PHQ-2 Score:     PHQ-2 ( 1999 Pfizer) 3/15/2019 12/17/2018   Q1: Little interest or pleasure in doing things 0 0   Q2: Feeling down, depressed or hopeless 0 0   PHQ-2 Score 0 0         I personally reviewed results withpatient as listed below:   Diagnostic Test Results:  Results for orders placed or performed in visit on 03/15/19 (from the past 24 hour(s))   Hemoglobin A1c   Result Value Ref Range    Hemoglobin A1C 8.5 (H) 4.0 - 6.0 %   Lipid Panel   Result Value Ref Range    Cholesterol 200 (H) <200 mg/dL    Triglycerides 146 <150 mg/dL    HDL Cholesterol 83 23 - 92 mg/dL    LDL Cholesterol Calculated 88 <100 mg/dL    Non HDL Cholesterol 117 <130 mg/dL   Comprehensive Metabolic Panel   Result Value Ref Range    Sodium 142 134 - 144 mmol/L    Potassium 4.3 3.5 - 5.1 mmol/L    Chloride 105 98 - 107 mmol/L    Carbon Dioxide 31 21 - 31 mmol/L    Anion Gap 6 3 - 14 mmol/L    Glucose 156 (H) 70 - 105 mg/dL    Urea Nitrogen 17 7 - 25 mg/dL    Creatinine 0.96 0.60 - 1.20 mg/dL    GFR Estimate 58 (L) >60 mL/min/[1.73_m2]    GFR Estimate If Black 70 >60 mL/min/[1.73_m2]    Calcium 9.4 8.6 - 10.3 mg/dL    Bilirubin Total 0.4 0.3 - 1.0 mg/dL    Albumin 4.2 3.5 - 5.7 g/dL    Protein Total 6.7 6.4 - 8.9 g/dL    Alkaline Phosphatase 55 34 - 104 U/L    ALT 15 7 - 52 U/L    AST 14 13 - 39 U/L   Thyrotropin GH   Result Value Ref Range    Thyrotropin 4.52 0.34 - 5.60 IU/mL       ASSESSMENT/PLAN:       ICD-10-CM    1. Type 2 diabetes mellitus without complication, without long-term current use of insulin (H) E11.9    2. Pure hypercholesterolemia E78.00    3. Essential hypertension I10        1.  Not ideally controlled.  A1c is creeping up more.  She is on metformin and Actos currently.  She prefers not to make any increases to her meds at this time.  She wants to try to work harder on diet and exercise.  Follow-up in 3 months to recheck labs.  Blood pressure is well  controlled. Eye exam is up to date.  On aspirin daily.  Non-smoker.  On ACE-I and statin.  Follow up in 3 months.  2.  Lipids are in fair control.  Continue on current meds.  3.  Blood pressure is well controlled.      Luz Levin MD  [facility] AND [facility]

## 2019-03-15 NOTE — NURSING NOTE
"Chief Complaint   Patient presents with     Diabetes     Previous A1C is not at goal of <8  Lab Results   Component Value Date    A1C 8.5 03/15/2019    A1C 7.3 12/17/2018    A1C 6.8 09/25/2018    A1C 7.3 06/29/2018    A1C 8.0 04/03/2018     Urine microalbumin:creatine: 0  Foot exam 12/17/18  Eye exam 02/18/19    Tobacco User no  Patient is on a daily aspirin  Patient is on a Statin.  Blood pressure today of:     BP Readings from Last 1 Encounters:   03/15/19 116/80      is at the goal of <139/89 for diabetics.    Carolina Pierre LPN on 3/15/2019 at 8:16 AM      Initial /80 (BP Location: Right arm, Patient Position: Sitting, Cuff Size: Adult Large)   Pulse 74   Temp 98.6  F (37  C) (Tympanic)   Resp 20   Ht 1.549 m (5' 1\")   Wt 86.6 kg (191 lb)   BMI 36.09 kg/m   Estimated body mass index is 36.09 kg/m  as calculated from the following:    Height as of this encounter: 1.549 m (5' 1\").    Weight as of this encounter: 86.6 kg (191 lb).  Medication Reconciliation: complete    Carolina Pierre LPN  "

## 2019-06-04 DIAGNOSIS — J30.1 ALLERGIC RHINITIS DUE TO POLLEN, UNSPECIFIED SEASONALITY: Primary | ICD-10-CM

## 2019-06-04 DIAGNOSIS — J30.2 SEASONAL ALLERGIC RHINITIS: ICD-10-CM

## 2019-06-05 RX ORDER — LORATADINE 10 MG/1
TABLET ORAL
Qty: 90 TABLET | Refills: 1 | Status: SHIPPED | OUTPATIENT
Start: 2019-06-05

## 2019-06-05 NOTE — TELEPHONE ENCOUNTER
Refill Request for: Loratadine (CLARITIN) 10 MG tablet   Received From: [id number]  Last Written Prescription Date: 05/22/18 for 90 tablets and 2 refills  LOV: 03/15/19 with PCP  Next Appointment: 06/13/19 with PCP  Protocol: Antihistamines Protocol Failed - 6/5 3:12 PM   Patient is 3-64 years of age       Routing refill request to provider for review/approval because: Antihistamine Protocol Failed due to patient's age.       Tamara Singh on 6/5/2019 at 3:16 PM

## 2019-06-11 DIAGNOSIS — E11.9 TYPE 2 DIABETES MELLITUS WITHOUT COMPLICATION, WITHOUT LONG-TERM CURRENT USE OF INSULIN (H): ICD-10-CM

## 2019-06-13 ENCOUNTER — OFFICE VISIT (OUTPATIENT)
Dept: FAMILY MEDICINE | Facility: OTHER | Age: 69
End: 2019-06-13
Attending: FAMILY MEDICINE
Payer: MEDICARE

## 2019-06-13 VITALS
TEMPERATURE: 96.6 F | WEIGHT: 187 LBS | HEIGHT: 61 IN | SYSTOLIC BLOOD PRESSURE: 122 MMHG | RESPIRATION RATE: 20 BRPM | DIASTOLIC BLOOD PRESSURE: 78 MMHG | BODY MASS INDEX: 35.3 KG/M2 | HEART RATE: 77 BPM

## 2019-06-13 DIAGNOSIS — I10 ESSENTIAL HYPERTENSION: ICD-10-CM

## 2019-06-13 DIAGNOSIS — E11.9 TYPE 2 DIABETES MELLITUS WITHOUT COMPLICATION, WITHOUT LONG-TERM CURRENT USE OF INSULIN (H): Primary | ICD-10-CM

## 2019-06-13 DIAGNOSIS — E78.00 PURE HYPERCHOLESTEROLEMIA: ICD-10-CM

## 2019-06-13 DIAGNOSIS — E11.9 TYPE 2 DIABETES MELLITUS WITHOUT COMPLICATION, WITHOUT LONG-TERM CURRENT USE OF INSULIN (H): ICD-10-CM

## 2019-06-13 LAB
ALBUMIN SERPL-MCNC: 4.4 G/DL (ref 3.5–5.7)
ALP SERPL-CCNC: 57 U/L (ref 34–104)
ALT SERPL W P-5'-P-CCNC: 16 U/L (ref 7–52)
ANION GAP SERPL CALCULATED.3IONS-SCNC: 8 MMOL/L (ref 3–14)
AST SERPL W P-5'-P-CCNC: 19 U/L (ref 13–39)
BILIRUB SERPL-MCNC: 0.3 MG/DL (ref 0.3–1)
BUN SERPL-MCNC: 16 MG/DL (ref 7–25)
CALCIUM SERPL-MCNC: 9.6 MG/DL (ref 8.6–10.3)
CHLORIDE SERPL-SCNC: 104 MMOL/L (ref 98–107)
CO2 SERPL-SCNC: 30 MMOL/L (ref 21–31)
CREAT SERPL-MCNC: 1 MG/DL (ref 0.6–1.2)
GFR SERPL CREATININE-BSD FRML MDRD: 55 ML/MIN/{1.73_M2}
GLUCOSE SERPL-MCNC: 127 MG/DL (ref 70–105)
HBA1C MFR BLD: 7.4 % (ref 4–6)
POTASSIUM SERPL-SCNC: 4.6 MMOL/L (ref 3.5–5.1)
PROT SERPL-MCNC: 6.8 G/DL (ref 6.4–8.9)
SODIUM SERPL-SCNC: 142 MMOL/L (ref 134–144)

## 2019-06-13 PROCEDURE — G0463 HOSPITAL OUTPT CLINIC VISIT: HCPCS

## 2019-06-13 PROCEDURE — 36415 COLL VENOUS BLD VENIPUNCTURE: CPT | Performed by: FAMILY MEDICINE

## 2019-06-13 PROCEDURE — 80053 COMPREHEN METABOLIC PANEL: CPT | Performed by: FAMILY MEDICINE

## 2019-06-13 PROCEDURE — 83036 HEMOGLOBIN GLYCOSYLATED A1C: CPT | Performed by: FAMILY MEDICINE

## 2019-06-13 PROCEDURE — 99213 OFFICE O/P EST LOW 20 MIN: CPT | Performed by: FAMILY MEDICINE

## 2019-06-13 RX ORDER — PIOGLITAZONEHYDROCHLORIDE 45 MG/1
45 TABLET ORAL DAILY
Qty: 90 TABLET | Refills: 3 | Status: SHIPPED | OUTPATIENT
Start: 2019-06-13 | End: 2020-04-24

## 2019-06-13 RX ORDER — LISINOPRIL 2.5 MG/1
2.5 TABLET ORAL DAILY
Qty: 90 TABLET | Refills: 3 | Status: SHIPPED | OUTPATIENT
Start: 2019-06-13 | End: 2021-07-07

## 2019-06-13 ASSESSMENT — PAIN SCALES - GENERAL: PAINLEVEL: NO PAIN (0)

## 2019-06-13 ASSESSMENT — ENCOUNTER SYMPTOMS
FATIGUE: 0
NUMBNESS: 0
WOUND: 0
FEVER: 0
NERVOUS/ANXIOUS: 0
COUGH: 0
PARESTHESIAS: 0

## 2019-06-13 ASSESSMENT — MIFFLIN-ST. JEOR: SCORE: 1310.61

## 2019-06-13 NOTE — PROGRESS NOTES
"  SUBJECTIVE:   Nursing Notes:   Carolina Pierre LPN  6/13/2019  8:39 AM  Sign at exiting of workspace  Chief Complaint   Patient presents with     Diabetes     Previous A1C is at goal of <8  Lab Results   Component Value Date    A1C 7.4 06/13/2019    A1C 8.5 03/15/2019    A1C 7.3 12/17/2018    A1C 6.8 09/25/2018    A1C 7.3 06/29/2018     Urine microalbumin:creatine: 1.00  Foot exam 03/15/19  Eye exam 11/20/18    Tobacco User no  Patient is on a daily aspirin  Patient is on a Statin.  Blood pressure today of:     BP Readings from Last 1 Encounters:   06/13/19 122/78      is at the goal of <139/89 for diabetics.    Carolina Pierre LPN on 6/13/2019 at 8:39 AM      Initial /78 (BP Location: Right arm, Patient Position: Sitting, Cuff Size: Adult Large)   Pulse 77   Temp 96.6  F (35.9  C) (Tympanic)   Resp 20   Ht 1.549 m (5' 1\")   Wt 84.8 kg (187 lb)   Breastfeeding? No   BMI 35.33 kg/m    Estimated body mass index is 35.33 kg/m  as calculated from the following:    Height as of this encounter: 1.549 m (5' 1\").    Weight as of this encounter: 84.8 kg (187 lb).  Medication Reconciliation: complete    Carolina Pierre LPN    Karen Vargas is a 68 year old female who presents to clinic today for a diabetic check.  She has been trying to be more watchful of her diet.  Still is walking at least 2 miles per day.  She is eating more sweets now than she had been a couple of months ago.    Diabetes     Diabetes:   She presents for follow up of diabetes.  She is checking home blood glucose one time daily. She checks blood glucose before meals.  Blood glucose is never over 200 and never under 70. She is aware of hypoglycemia symptoms including shakiness, dizziness, weakness and lethargy. She has no concerns regarding her diabetes at this time.  She is not experiencing numbness or burning in feet, excessive thirst, blurry vision, weight changes or redness, sores or blisters on feet. The patient has had a " "diabetic eye exam in the last 12 months. Eye exam performed on 18.    Diabetes Management Resources  Diabetes comment:  [employer]      I personally reviewed medications/allergies/history listed below:    Patient Active Problem List    Diagnosis Date Noted     Blepharoconjunctivitis of both eyes 03/15/2019     Priority: Medium     Cataract extraction status 03/15/2019     Priority: Medium     Age-related nuclear cataract, bilateral 03/15/2019     Priority: Medium     Other specified postprocedural states 03/15/2019     Priority: Medium     Allergic rhinitis due to pollen 2018     Priority: Medium     Diabetes mellitus, type II (H) 2018     Priority: Medium     Overview:   Type 2 diabetes mellitus, insulin instituted , stopped agents after   gastric bypass .  Oral agents restarted  with increased weight.       Hyperlipidemia 2018     Priority: Medium     Hypertension 2018     Priority: Medium     Pain in joint, pelvic region and thigh 2011     Priority: Medium     Obesity 2010     Priority: Medium     Overview:   Height5'1  \"BMI09/07 - 38  [id number]/, 205Abd CircGreater than 35\"       Past Medical History:   Diagnosis Date     Basal cell carcinoma of skin of nose     2015     Colon cancer screening 2018    FIT test neg     Personal history of other medical treatment (CODE)     G2, P2-0-0-2     Personal history of other medical treatment (CODE)     History of morbid obesity, weight loss on byetta     Type 2 diabetes mellitus without complications (H)     No Comments Provided      Past Surgical History:   Procedure Laterality Date     ARTHROSCOPY KNEE           COLONOSCOPY      2006,10 year f/u     ENHANCE LASER REFRACTIVE NEW PT IN PARAMETERS      2000,LASIK refractive eye surgery      LAPAROSCOPIC BYPASS GASTRIC      Silvestre-en-Y gastric bypass with incidental cholecystectomy and umbilical hernia repair     LAPAROSCOPIC " CHOLECYSTECTOMY      No Comments Provided     OTHER SURGICAL HISTORY      ,HERNIA REPAIR, umbilical hernia repair     OTHER SURGICAL HISTORY      6/15/16,[id number],CATARACT EXTRACTION W/  INTRAOCULAR LENS IMPLANT,Left     RELEASE CARPAL TUNNEL      2000, 2000,Laparoscopic carpal tunnel release right hand, left hand     Family History   Problem Relation Age of Onset     Cancer Father         Cancer,lung cancer ,      Heart Disease Mother         Heart Disease,Coronary artery disease,      Colon Cancer Mother         Cancer-colon,bladder cancer, colon cancer, metastatic to liver, colon cancer     Breast Cancer Sister         Cancer-breast,metastatic breast cancer at age 63     Cancer Sister         Cancer,one  of uterine cancer age 48     Cancer Sister         Cancer,one  of bone cancer age 46.     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   - Georgi - metastatic pancreatic cancer,  2012.  Peña 72 son  Tsering 10/4/74 daughter  grandson - Barry     Current Outpatient Medications   Medication Sig Dispense Refill     aspirin EC 81 MG EC tablet Take 81 mg by mouth daily with food       blood glucose monitoring (NO BRAND SPECIFIED) test strip Use to test blood sugar one time daily or as directed. 100 each 3     blood glucose monitoring (SOFTCLIX) lancets Use to check blood sugars once daily and as  "needed.  Diagnosis:  E11.9. 100 each 3     Blood Glucose Monitoring Suppl (FIFTY50 GLUCOSE METER 2.0) W/DEVICE KIT Accu chek Lenora Plus meter.  E11.9 NIDDM type II - Test 1 time/day.       fish oil-omega-3 fatty acids 1000 MG capsule Take 1 capsule by mouth daily       glucosamine sulfate 1000 MG CAPS Take 2,000 mg by mouth daily       IRON PO Take 325 mg by mouth daily with food       lisinopril (PRINIVIL/ZESTRIL) 2.5 MG tablet Take 1 tablet (2.5 mg) by mouth daily 90 tablet 3     loratadine (CLARITIN) 10 MG tablet TAKE 1 TABLET BY MOUTH EVERY DAY 90 tablet 1     metFORMIN (GLUCOPHAGE) 1000 MG tablet Take 1 tablet (1,000 mg) by mouth 2 times daily (with meals) 180 tablet 3     metoprolol tartrate (LOPRESSOR) 50 MG tablet Take 0.5 tablets (25 mg) by mouth 2 times daily 180 tablet 3     Multiple Vitamin (MULTI-VITAMINS) TABS Take 1 tablet by mouth daily       pioglitazone (ACTOS) 45 MG tablet Take 1 tablet (45 mg) by mouth daily 90 tablet 3     simvastatin (ZOCOR) 10 MG tablet TAKE 1 TABLET BY MOUTH ONCE DAILY. 90 tablet 3     triamterene-hydrochlorothiazide (MAXZIDE-25) 37.5-25 MG per tablet Take 0.5 tablets by mouth daily 45 tablet 3     vitamin B-12 (CYANOCOBALAMIN) 1000 MCG/ML injection INJECT 1ML INTRAMUSCULARLY EVERY 4 WEEKS. 3 mL 3     Allergies   Allergen Reactions     Cefaclor Rash       Review of Systems   Constitutional: Negative for fatigue and fever.   Respiratory: Negative for cough.    Skin: Negative for wound.   Neurological: Negative for numbness and paresthesias.   Psychiatric/Behavioral: Negative for mood changes. The patient is not nervous/anxious.         OBJECTIVE:     /78 (BP Location: Right arm, Patient Position: Sitting, Cuff Size: Adult Large)   Pulse 77   Temp 96.6  F (35.9  C) (Tympanic)   Resp 20   Ht 1.549 m (5' 1\")   Wt 84.8 kg (187 lb)   Breastfeeding? No   BMI 35.33 kg/m    Body mass index is 35.33 kg/m .  Physical Exam   Constitutional: She is oriented to person, place, " and time. She appears well-developed.   HENT:   Head: Normocephalic.   Eyes: Pupils are equal, round, and reactive to light.   Neck: Normal range of motion.   Cardiovascular: Normal rate, regular rhythm and normal heart sounds.   No murmur heard.  Pulmonary/Chest: Effort normal and breath sounds normal. No respiratory distress. She has no wheezes. She has no rales.   Musculoskeletal: She exhibits no edema.   Neurological: She is alert and oriented to person, place, and time.   Normal sensation with monofilament exam bilaterally.   Skin: Skin is warm.   No lesions on feet.   Psychiatric: She has a normal mood and affect.         PHQ-2 Score:     PHQ-2 ( 1999 Pfizer) 6/13/2019 3/15/2019   Q1: Little interest or pleasure in doing things 0 0   Q2: Feeling down, depressed or hopeless 0 0   PHQ-2 Score 0 0       I personally reviewed results withpatient as listed below:   Diagnostic Test Results:  Results for orders placed or performed in visit on 06/13/19 (from the past 24 hour(s))   Hemoglobin A1c   Result Value Ref Range    Hemoglobin A1C 7.4 (H) 4.0 - 6.0 %   Comprehensive Metabolic Panel   Result Value Ref Range    Sodium 142 134 - 144 mmol/L    Potassium 4.6 3.5 - 5.1 mmol/L    Chloride 104 98 - 107 mmol/L    Carbon Dioxide 30 21 - 31 mmol/L    Anion Gap 8 3 - 14 mmol/L    Glucose 127 (H) 70 - 105 mg/dL    Urea Nitrogen 16 7 - 25 mg/dL    Creatinine 1.00 0.60 - 1.20 mg/dL    GFR Estimate 55 (L) >60 mL/min/[1.73_m2]    GFR Estimate If Black 67 >60 mL/min/[1.73_m2]    Calcium 9.6 8.6 - 10.3 mg/dL    Bilirubin Total 0.3 0.3 - 1.0 mg/dL    Albumin 4.4 3.5 - 5.7 g/dL    Protein Total 6.8 6.4 - 8.9 g/dL    Alkaline Phosphatase 57 34 - 104 U/L    ALT 16 7 - 52 U/L    AST 19 13 - 39 U/L       ASSESSMENT/PLAN:       ICD-10-CM    1. Type 2 diabetes mellitus without complication, without long-term current use of insulin (H) E11.9 lisinopril (PRINIVIL/ZESTRIL) 2.5 MG tablet     metFORMIN (GLUCOPHAGE) 1000 MG tablet      pioglitazone (ACTOS) 45 MG tablet   2. Essential hypertension I10    3. Pure hypercholesterolemia E78.00        1.  Not ideally controlled, although improved from last check.  A1c is slightly high, but improved.  Increase actos to 45 mg daily from 30 mg daily.  Continue on metformin.  Blood pressure is well controlled. Eye exam is up to date.  On aspirin daily.  Non-smoker.  On ACE-I and statin.  Follow up in 3 months.  2.  Blood pressure is stable as noted.  3.  Lipids will be checked at next visit.  She is on Simvastatin.      Luz Levin MD  [facility]

## 2019-06-13 NOTE — NURSING NOTE
"Chief Complaint   Patient presents with     Diabetes     Previous A1C is at goal of <8  Lab Results   Component Value Date    A1C 7.4 06/13/2019    A1C 8.5 03/15/2019    A1C 7.3 12/17/2018    A1C 6.8 09/25/2018    A1C 7.3 06/29/2018     Urine microalbumin:creatine: 1.00  Foot exam 03/15/19  Eye exam 11/20/18    Tobacco User no  Patient is on a daily aspirin  Patient is on a Statin.  Blood pressure today of:     BP Readings from Last 1 Encounters:   06/13/19 122/78      is at the goal of <139/89 for diabetics.    Carolina Pierre LPN on 6/13/2019 at 8:39 AM      Initial /78 (BP Location: Right arm, Patient Position: Sitting, Cuff Size: Adult Large)   Pulse 77   Temp 96.6  F (35.9  C) (Tympanic)   Resp 20   Ht 1.549 m (5' 1\")   Wt 84.8 kg (187 lb)   Breastfeeding? No   BMI 35.33 kg/m   Estimated body mass index is 35.33 kg/m  as calculated from the following:    Height as of this encounter: 1.549 m (5' 1\").    Weight as of this encounter: 84.8 kg (187 lb).  Medication Reconciliation: complete    Carolina Pierre LPN  "

## 2019-06-14 RX ORDER — PIOGLITAZONEHYDROCHLORIDE 30 MG/1
TABLET ORAL
Qty: 90 TABLET | Refills: 3 | OUTPATIENT
Start: 2019-06-14

## 2019-06-14 NOTE — TELEPHONE ENCOUNTER
Requested Prescriptions   Pending Prescriptions Disp Refills     pioglitazone (ACTOS) 30 MG tablet [Pharmacy Med Name: PIOGLITAZONE HCL 30 MG TABLET] 90 tablet 3     Sig: TAKE 1 TABLET BY MOUTH EVERY DAY       Thiazolidinedione Agents (TZDs)  Passed - 6/11/2019  9:38 AM        Passed - Blood pressure less than 140/90 in past 6 months     BP Readings from Last 3 Encounters:   06/13/19 122/78   03/15/19 116/80   12/17/18 120/70                 Passed - Patient has documented LDL within the past 12 mos.     Recent Labs   Lab Test 03/15/19  0747   LDL 88             Passed - Patient has a normal ALT within the past 12 mos.     Recent Labs   Lab Test 06/13/19 0756 12/29/17  0902   ALT 16   < >  --    GICHALT  --   --  16    < > = values in this interval not displayed.             Passed - Patient has a normal AST within the past 12 mos.      Recent Labs   Lab Test 06/13/19 0756 12/29/17  0902   AST 19   < >  --    GICHAST  --   --  14    < > = values in this interval not displayed.             Passed - Patient has had a Microalbumin in the past 12 mos.     Recent Labs   Lab Test 12/17/18  0815 12/29/17  2147   MICROL 9  --    UMALCR 12.30  --    [id number]  --  7.4             Passed - Patient has documented A1c within the specified period of time.     If HgbA1C is 8 or greater, it needs to be on file within the past 3 months.  If less than 8, must be on file within the past 6 months.     Recent Labs   Lab Test 06/13/19 0756 12/29/17  0923   A1C 7.4*   < >  --    [id number]  --   --  7.0*    < > = values in this interval not displayed.             Passed - Diagnosis not CHF        Passed - Medication is active on med list        Passed - Patient is age 18 or older        Passed - Patient is not pregnant        Passed - Patient has a normal serum Creatinine in the past 12 months     Recent Labs   Lab Test 06/13/19  0756   CR 1.00             Passed - Patient has not had a positive pregnancy test within the past 12 mos.  "       Passed - Recent (6 mo) or future (30 days) visit within the authorizing provider's specialty     Patient had office visit in the last 6 months or has a visit in the next 30 days with authorizing provider or within the authorizing provider's specialty.  See \"Patient Info\" tab in inbasket, or \"Choose Columns\" in Meds & Orders section of the refill encounter.            LOV 06/13/19  meds already refilled for one year at this appt.  "

## 2019-08-08 DIAGNOSIS — E78.5 HYPERLIPIDEMIA LDL GOAL <100: ICD-10-CM

## 2019-08-12 RX ORDER — SIMVASTATIN 10 MG
TABLET ORAL
Qty: 30 TABLET | Refills: 0 | Status: SHIPPED | OUTPATIENT
Start: 2019-08-12 | End: 2019-09-06

## 2019-08-12 NOTE — TELEPHONE ENCOUNTER
Refill Request for: Simvastatin (ZOCOR) 10 MG tablet   Received From: [facility] #[id number]  Last Written Prescription Date: 8/13/18 for 90 tablets and 3 refills  LOV: 6/13/19 with PCP  Next Appointment: 9/13/19 with PCP  Protocol: Statins Protocol Passed - 8/12 1:58 PM    Prescription approved per [facility] [place] [facility].

## 2019-09-06 DIAGNOSIS — E78.5 HYPERLIPIDEMIA LDL GOAL <100: ICD-10-CM

## 2019-09-06 RX ORDER — SIMVASTATIN 10 MG
TABLET ORAL
Qty: 30 TABLET | Refills: 0 | Status: SHIPPED | OUTPATIENT
Start: 2019-09-06 | End: 2019-10-05

## 2019-09-06 NOTE — TELEPHONE ENCOUNTER
"Requested Prescriptions   Pending Prescriptions Disp Refills     simvastatin (ZOCOR) 10 MG tablet [Pharmacy Med Name: SIMVASTATIN 10 MG TABLET] 30 tablet 0     Sig: TAKE 1 TABLET BY MOUTH EVERY DAY       Statins Protocol Passed - 9/6/2019  1:15 AM        Passed - LDL on file in past 12 months     Recent Labs   Lab Test 03/15/19  0747   LDL 88             Passed - No abnormal creatine kinase in past 12 months     No lab results found.             Passed - Recent (12 mo) or future (30 days) visit within the authorizing provider's specialty     Patient had office visit in the last 12 months or has a visit in the next 30 days with authorizing provider or within the authorizing provider's specialty.  See \"Patient Info\" tab in inbasket, or \"Choose Columns\" in Meds & Orders section of the refill encounter.              Passed - Medication is active on med list        Passed - Patient is age 18 or older        Passed - No active pregnancy on record        Passed - No positive pregnancy test in past 12 months        lov 06/13/19  Prescription approved per [facility] [place]. Long term use to be discussed at upcoming appt on 9/1/2019.Per [facility]  "

## 2019-09-13 ENCOUNTER — OFFICE VISIT (OUTPATIENT)
Dept: FAMILY MEDICINE | Facility: OTHER | Age: 69
End: 2019-09-13
Attending: FAMILY MEDICINE
Payer: MEDICARE

## 2019-09-13 VITALS
OXYGEN SATURATION: 90 % | BODY MASS INDEX: 34.74 KG/M2 | TEMPERATURE: 96.8 F | HEART RATE: 60 BPM | SYSTOLIC BLOOD PRESSURE: 120 MMHG | WEIGHT: 184 LBS | HEIGHT: 61 IN | DIASTOLIC BLOOD PRESSURE: 80 MMHG | RESPIRATION RATE: 18 BRPM

## 2019-09-13 DIAGNOSIS — I10 ESSENTIAL HYPERTENSION: ICD-10-CM

## 2019-09-13 DIAGNOSIS — E11.9 TYPE 2 DIABETES MELLITUS WITHOUT COMPLICATION, WITHOUT LONG-TERM CURRENT USE OF INSULIN (H): Primary | ICD-10-CM

## 2019-09-13 DIAGNOSIS — E78.00 PURE HYPERCHOLESTEROLEMIA: ICD-10-CM

## 2019-09-13 DIAGNOSIS — E11.9 TYPE 2 DIABETES MELLITUS WITHOUT COMPLICATION, WITHOUT LONG-TERM CURRENT USE OF INSULIN (H): ICD-10-CM

## 2019-09-13 DIAGNOSIS — L98.9 SKIN LESION: ICD-10-CM

## 2019-09-13 LAB
ALBUMIN SERPL-MCNC: 4.2 G/DL (ref 3.5–5.7)
ALP SERPL-CCNC: 53 U/L (ref 34–104)
ALT SERPL W P-5'-P-CCNC: 13 U/L (ref 7–52)
ANION GAP SERPL CALCULATED.3IONS-SCNC: 8 MMOL/L (ref 3–14)
AST SERPL W P-5'-P-CCNC: 14 U/L (ref 13–39)
BILIRUB SERPL-MCNC: 0.4 MG/DL (ref 0.3–1)
BUN SERPL-MCNC: 16 MG/DL (ref 7–25)
CALCIUM SERPL-MCNC: 9.5 MG/DL (ref 8.6–10.3)
CHLORIDE SERPL-SCNC: 104 MMOL/L (ref 98–107)
CHOLEST SERPL-MCNC: 191 MG/DL
CO2 SERPL-SCNC: 31 MMOL/L (ref 21–31)
CREAT SERPL-MCNC: 0.95 MG/DL (ref 0.6–1.2)
GFR SERPL CREATININE-BSD FRML MDRD: 58 ML/MIN/{1.73_M2}
GLUCOSE SERPL-MCNC: 134 MG/DL (ref 70–105)
HBA1C MFR BLD: 7.1 % (ref 4–6)
HDLC SERPL-MCNC: 76 MG/DL (ref 23–92)
LDLC SERPL CALC-MCNC: 85 MG/DL
NONHDLC SERPL-MCNC: 115 MG/DL
POTASSIUM SERPL-SCNC: 4.1 MMOL/L (ref 3.5–5.1)
PROT SERPL-MCNC: 6.7 G/DL (ref 6.4–8.9)
SODIUM SERPL-SCNC: 143 MMOL/L (ref 134–144)
TRIGL SERPL-MCNC: 150 MG/DL

## 2019-09-13 PROCEDURE — 99213 OFFICE O/P EST LOW 20 MIN: CPT | Performed by: FAMILY MEDICINE

## 2019-09-13 PROCEDURE — 80053 COMPREHEN METABOLIC PANEL: CPT | Mod: ZL | Performed by: FAMILY MEDICINE

## 2019-09-13 PROCEDURE — 80061 LIPID PANEL: CPT | Mod: ZL | Performed by: FAMILY MEDICINE

## 2019-09-13 PROCEDURE — 83036 HEMOGLOBIN GLYCOSYLATED A1C: CPT | Mod: ZL | Performed by: FAMILY MEDICINE

## 2019-09-13 PROCEDURE — G0463 HOSPITAL OUTPT CLINIC VISIT: HCPCS

## 2019-09-13 PROCEDURE — 36415 COLL VENOUS BLD VENIPUNCTURE: CPT | Mod: ZL | Performed by: FAMILY MEDICINE

## 2019-09-13 RX ORDER — TRIAMTERENE/HYDROCHLOROTHIAZID 37.5-25 MG
0.5 TABLET ORAL DAILY
Qty: 45 TABLET | Refills: 3 | Status: SHIPPED | OUTPATIENT
Start: 2019-09-13 | End: 2020-04-24

## 2019-09-13 RX ORDER — LANCETS
EACH MISCELLANEOUS
Qty: 100 EACH | Refills: 3 | Status: SHIPPED | OUTPATIENT
Start: 2019-09-13

## 2019-09-13 RX ORDER — METOPROLOL TARTRATE 50 MG
25 TABLET ORAL 2 TIMES DAILY
Qty: 90 TABLET | Refills: 3 | Status: SHIPPED | OUTPATIENT
Start: 2019-09-13

## 2019-09-13 ASSESSMENT — MIFFLIN-ST. JEOR: SCORE: 1292

## 2019-09-13 ASSESSMENT — PAIN SCALES - GENERAL: PAINLEVEL: NO PAIN (0)

## 2019-09-13 NOTE — PROGRESS NOTES
"  SUBJECTIVE:   Nursing Notes:   Carolina Pierre LPN  9/13/2019  8:57 AM  Sign at exiting of workspace  Chief Complaint   Patient presents with     Diabetes     Previous A1C is at goal of <8  Lab Results   Component Value Date    A1C 7.1 09/13/2019    A1C 7.4 06/13/2019    A1C 8.5 03/15/2019    A1C 7.3 12/17/2018    A1C 6.8 09/25/2018     Urine microalbumin:creatine: 0  Foot exam 06/13/19  Eye exam 11/20/18    Tobacco User no  Patient is on a daily aspirin  Patient is on a Statin.  Blood pressure today of:     BP Readings from Last 1 Encounters:   09/13/19 120/80      is at the goal of <139/89 for diabetics.    Carolina Pierre LPN on 9/13/2019 at 8:56 AM      Initial /80 (BP Location: Right arm, Patient Position: Sitting, Cuff Size: Adult Regular)   Pulse 60   Temp 96.8  F (36  C) (Tympanic)   Resp 18   Ht 1.549 m (5' 1\")   Wt 83.5 kg (184 lb)   SpO2 90%   Breastfeeding? No   BMI 34.77 kg/m    Estimated body mass index is 34.77 kg/m  as calculated from the following:    Height as of this encounter: 1.549 m (5' 1\").    Weight as of this encounter: 83.5 kg (184 lb).  Medication Reconciliation: complete    Carolina Pierre LPN    Karen Vargas is a 69 year old female who presents to clinic today for a diabetic check.    Has a rough area on her nose again where her basal cell was previously removed.  She is worried that her skin cancer could be returning.    Diabetes:   She presents for follow up of diabetes.  She is checking home blood glucose one time daily. She checks blood glucose before meals.  Blood glucose is sometimes over 200 (very rarely) and never under 70. She is aware of hypoglycemia symptoms including shakiness, dizziness, weakness and lethargy. She has no concerns regarding her diabetes at this time.  She is not experiencing numbness or burning in feet, excessive thirst, blurry vision, weight changes or redness, sores or blisters on feet. The patient has had a diabetic eye exam in the " "last 12 months. Eye exam performed on 18.    Diabetes Management Resources  Diabetes comment:  Fastin-126      I personally reviewed medications/allergies/history listed below:    Patient Active Problem List    Diagnosis Date Noted     Blepharoconjunctivitis of both eyes 03/15/2019     Priority: Medium     Cataract extraction status 03/15/2019     Priority: Medium     Age-related nuclear cataract, bilateral 03/15/2019     Priority: Medium     Other specified postprocedural states 03/15/2019     Priority: Medium     Allergic rhinitis due to pollen 2018     Priority: Medium     Diabetes mellitus, type II (H) 2018     Priority: Medium     Overview:   Type 2 diabetes mellitus, insulin instituted , stopped agents after   gastric bypass .  Oral agents restarted  with increased weight.       Hyperlipidemia 2018     Priority: Medium     Hypertension 2018     Priority: Medium     Pain in joint, pelvic region and thigh 2011     Priority: Medium     Obesity 2010     Priority: Medium     Overview:   Height5'1  \"BMI09/07 - 38  Tifmce61//, 205Abd CircGreater than 35\"       Past Medical History:   Diagnosis Date     Basal cell carcinoma of skin of nose     2015     Colon cancer screening 2018    FIT test neg     Personal history of other medical treatment (CODE)     G2, P2-0-0-2     Personal history of other medical treatment (CODE)     History of morbid obesity, weight loss on byetta     Type 2 diabetes mellitus without complications (H)     No Comments Provided      Past Surgical History:   Procedure Laterality Date     ARTHROSCOPY KNEE           COLONOSCOPY      2006,10 year f/u     ENHANCE LASER REFRACTIVE NEW PT IN PARAMETERS      2000,LASIK refractive eye surgery      LAPAROSCOPIC BYPASS GASTRIC      Silvestre-en-Y gastric bypass with incidental cholecystectomy and umbilical hernia repair     LAPAROSCOPIC CHOLECYSTECTOMY      No Comments " Provided     OTHER SURGICAL HISTORY      ,HERNIA REPAIR, umbilical hernia repair     OTHER SURGICAL HISTORY      6/15/16,RME206,CATARACT EXTRACTION W/  INTRAOCULAR LENS IMPLANT,Left     RELEASE CARPAL TUNNEL      2000, 2000,Laparoscopic carpal tunnel release right hand, left hand     Family History   Problem Relation Age of Onset     Cancer Father         Cancer,lung cancer ,      Heart Disease Mother         Heart Disease,Coronary artery disease,      Colon Cancer Mother         Cancer-colon,bladder cancer, colon cancer, metastatic to liver, colon cancer     Breast Cancer Sister         Cancer-breast,metastatic breast cancer at age 63     Cancer Sister         Cancer,one  of uterine cancer age 48     Cancer Sister         Cancer,one  of bone cancer age 46.     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   - Georgi - metastatic pancreatic cancer,  2012.  Peña 72 son  Tsering 10/4/74 daughter  grandson - Barry     Current Outpatient Medications   Medication Sig Dispense Refill     aspirin EC 81 MG EC tablet Take 81 mg by mouth daily with food       blood glucose (NO BRAND SPECIFIED) test strip Use to test blood sugar one time daily or as directed. 100 each 3     blood glucose monitoring (SOFTCLIX) lancets Use to check blood sugars once daily and as needed.  Diagnosis:  E11.9. 100 each 3      "Blood Glucose Monitoring Suppl (FIFTY50 GLUCOSE METER 2.0) W/DEVICE KIT Accu chek Lenora Plus meter.  E11.9 NIDDM type II - Test 1 time/day.       fish oil-omega-3 fatty acids 1000 MG capsule Take 1 capsule by mouth daily       glucosamine sulfate 1000 MG CAPS Take 2,000 mg by mouth daily       IRON PO Take 325 mg by mouth daily with food       lisinopril (PRINIVIL/ZESTRIL) 2.5 MG tablet Take 1 tablet (2.5 mg) by mouth daily 90 tablet 3     loratadine (CLARITIN) 10 MG tablet TAKE 1 TABLET BY MOUTH EVERY DAY 90 tablet 1     metFORMIN (GLUCOPHAGE) 1000 MG tablet Take 1 tablet (1,000 mg) by mouth 2 times daily (with meals) 180 tablet 3     metoprolol tartrate (LOPRESSOR) 50 MG tablet Take 0.5 tablets (25 mg) by mouth 2 times daily 90 tablet 3     Multiple Vitamin (MULTI-VITAMINS) TABS Take 1 tablet by mouth daily       pioglitazone (ACTOS) 45 MG tablet Take 1 tablet (45 mg) by mouth daily 90 tablet 3     simvastatin (ZOCOR) 10 MG tablet TAKE 1 TABLET BY MOUTH EVERY DAY 30 tablet 0     triamterene-HCTZ (MAXZIDE-25) 37.5-25 MG tablet Take 0.5 tablets by mouth daily 45 tablet 3     vitamin B-12 (CYANOCOBALAMIN) 1000 MCG/ML injection INJECT 1ML INTRAMUSCULARLY EVERY 4 WEEKS. 3 mL 3     Allergies   Allergen Reactions     Cefaclor Rash       Review of Systems   Constitutional: Negative for chills and fever.   Respiratory: Negative for cough and shortness of breath.    Skin: Positive for wound.   Psychiatric/Behavioral: Negative for mood changes. The patient is not nervous/anxious.         OBJECTIVE:     /80 (BP Location: Right arm, Patient Position: Sitting, Cuff Size: Adult Regular)   Pulse 60   Temp 96.8  F (36  C) (Tympanic)   Resp 18   Ht 1.549 m (5' 1\")   Wt 83.5 kg (184 lb)   SpO2 90%   Breastfeeding? No   BMI 34.77 kg/m    Body mass index is 34.77 kg/m .  Physical Exam   Constitutional: She is oriented to person, place, and time. She appears well-developed.   HENT:   Head: Normocephalic.   Eyes: Pupils " are equal, round, and reactive to light.   Neck: Normal range of motion.   Cardiovascular: Normal rate, regular rhythm and normal heart sounds.   No murmur heard.  Pulmonary/Chest: Effort normal and breath sounds normal. No respiratory distress. She has no wheezes. She has no rales.   Musculoskeletal: She exhibits no edema.   Neurological: She is alert and oriented to person, place, and time.   Normal sensation with monofilament exam bilaterally.   Skin: Skin is warm.   No lesions on feet.    On the right side of the tip of her nose, there is a rough area with a central region of scabbing.  There is also another smaller rough area on the left side of the bridge of her nose.   Psychiatric: She has a normal mood and affect.           PHQ-2 Score:     PHQ-2 ( 1999 Pfizer) 9/13/2019 6/13/2019   Q1: Little interest or pleasure in doing things 0 0   Q2: Feeling down, depressed or hopeless 0 0   PHQ-2 Score 0 0           I personally reviewed results withpatient as listed below:   Diagnostic Test Results:  Results for orders placed or performed in visit on 09/13/19   Hemoglobin A1c   Result Value Ref Range    Hemoglobin A1C 7.1 (H) 4.0 - 6.0 %   Lipid Panel   Result Value Ref Range    Cholesterol 191 <200 mg/dL    Triglycerides 150 (H) <150 mg/dL    HDL Cholesterol 76 23 - 92 mg/dL    LDL Cholesterol Calculated 85 <100 mg/dL    Non HDL Cholesterol 115 <130 mg/dL   Comprehensive Metabolic Panel   Result Value Ref Range    Sodium 143 134 - 144 mmol/L    Potassium 4.1 3.5 - 5.1 mmol/L    Chloride 104 98 - 107 mmol/L    Carbon Dioxide 31 21 - 31 mmol/L    Anion Gap 8 3 - 14 mmol/L    Glucose 134 (H) 70 - 105 mg/dL    Urea Nitrogen 16 7 - 25 mg/dL    Creatinine 0.95 0.60 - 1.20 mg/dL    GFR Estimate 58 (L) >60 mL/min/[1.73_m2]    GFR Estimate If Black 70 >60 mL/min/[1.73_m2]    Calcium 9.5 8.6 - 10.3 mg/dL    Bilirubin Total 0.4 0.3 - 1.0 mg/dL    Albumin 4.2 3.5 - 5.7 g/dL    Protein Total 6.7 6.4 - 8.9 g/dL    Alkaline  Phosphatase 53 34 - 104 U/L    ALT 13 7 - 52 U/L    AST 14 13 - 39 U/L        ASSESSMENT/PLAN:       ICD-10-CM    1. Type 2 diabetes mellitus without complication, without long-term current use of insulin (H) E11.9 blood glucose (NO BRAND SPECIFIED) test strip     blood glucose monitoring (SOFTCLIX) lancets   2. Essential hypertension I10 metoprolol tartrate (LOPRESSOR) 50 MG tablet     triamterene-HCTZ (MAXZIDE-25) 37.5-25 MG tablet   3. Pure hypercholesterolemia E78.00    4. Skin lesion L98.9 GENERAL SURG ADULT REFERRAL       1.  Stable.  A1c is still a little high, but improving with lifestyle changes.  She feels that she wants to continue working on diet/exercise instead of adding any further medications.  Blood pressure is well controlled. Eye exam is up to date.  On aspirin daily.  Non-smoker.  On ACE-I and statin.  Follow up in 3 months.  2.  Blood pressure stable as above.  Medications refilled as above.  3.  Lipids stable.  On Simvastatin.  4.  Referred to general surgery for consideration of excision of area.  Discussed that due to location, she may need to be referred for Mohs surgery.  Appreciate general surgery's opinion.    Luz Levin MD  [facility]    Portions of this dictation were created using the Dragon Nuance voice recognition system. Proofreading was completed but there may be errors in text.

## 2019-09-13 NOTE — NURSING NOTE
"Chief Complaint   Patient presents with     Diabetes     Previous A1C is at goal of <8  Lab Results   Component Value Date    A1C 7.1 09/13/2019    A1C 7.4 06/13/2019    A1C 8.5 03/15/2019    A1C 7.3 12/17/2018    A1C 6.8 09/25/2018     Urine microalbumin:creatine: 0  Foot exam 06/13/19  Eye exam 11/20/18    Tobacco User no  Patient is on a daily aspirin  Patient is on a Statin.  Blood pressure today of:     BP Readings from Last 1 Encounters:   09/13/19 120/80      is at the goal of <139/89 for diabetics.    Carolina Pierre LPN on 9/13/2019 at 8:56 AM      Initial /80 (BP Location: Right arm, Patient Position: Sitting, Cuff Size: Adult Regular)   Pulse 60   Temp 96.8  F (36  C) (Tympanic)   Resp 18   Ht 1.549 m (5' 1\")   Wt 83.5 kg (184 lb)   SpO2 90%   Breastfeeding? No   BMI 34.77 kg/m   Estimated body mass index is 34.77 kg/m  as calculated from the following:    Height as of this encounter: 1.549 m (5' 1\").    Weight as of this encounter: 83.5 kg (184 lb).  Medication Reconciliation: complete    Carolina Pierre LPN  "

## 2019-09-14 ASSESSMENT — ENCOUNTER SYMPTOMS
CHILLS: 0
COUGH: 0
NERVOUS/ANXIOUS: 0
WOUND: 1
SHORTNESS OF BREATH: 0
FEVER: 0

## 2019-09-20 ENCOUNTER — OFFICE VISIT (OUTPATIENT)
Dept: SURGERY | Facility: OTHER | Age: 69
End: 2019-09-20
Attending: FAMILY MEDICINE
Payer: MEDICARE

## 2019-09-20 VITALS
BODY MASS INDEX: 35.3 KG/M2 | TEMPERATURE: 97.5 F | RESPIRATION RATE: 18 BRPM | WEIGHT: 186.8 LBS | DIASTOLIC BLOOD PRESSURE: 80 MMHG | SYSTOLIC BLOOD PRESSURE: 122 MMHG | HEART RATE: 74 BPM

## 2019-09-20 DIAGNOSIS — L98.9 SKIN LESION: Primary | ICD-10-CM

## 2019-09-20 PROCEDURE — 88305 TISSUE EXAM BY PATHOLOGIST: CPT

## 2019-09-20 PROCEDURE — G0463 HOSPITAL OUTPT CLINIC VISIT: HCPCS

## 2019-09-20 PROCEDURE — 11641 EXC F/E/E/N/L MAL+MRG 0.6-1: CPT | Performed by: SURGERY

## 2019-09-20 ASSESSMENT — PAIN SCALES - GENERAL: PAINLEVEL: NO PAIN (0)

## 2019-09-20 NOTE — PROGRESS NOTES
Procedure Note      Pre/Post Operative Diagnosis:   Nose recurrent basal cell carinoma    Procedure:   Removal of Nose recurrent basal cell carinoma    Surgeon: Nii Llamas MD    Local Anesthesia: 1% lidocaine with 0.25% Marcaine with epinephrine    Indication for the procedure:  This is a 69 year old female  patient referred by Luz Levin with a skin lesion that was basal cell carcinoma with positive margins in 2015.       After explaining the risks to include bleeding, infection, recurrence or need for reexcision, and scarring the patientwished to proceed.    Procedure:   The area was prepped and draped in usual sterile fashion with ChloraPrep.   After, adequate local anesthesia, an 1.6 cm X 0.6 cm elliptical skin incision was made to encompass the 0.4 cm lesion with margins.  The skin was closed with 5-0 Monocryl and Dermabond.      Plan:  The patient will be called to review the pathology. Patient will follow up if there any problems with the wound including redness or drainage.      Nii Llamas MD....................  9/20/2019   9:59 AM

## 2019-09-20 NOTE — PROGRESS NOTES
Prior to the start of the procedure and with procedural staff participation, I verbally confirmed the patient s identity using two indicators, relevant allergies, that the procedure was appropriate and matched the consent or emergent situation, and that the correct equipment/implants were available. Immediately prior to starting the procedure I conducted the Time Out with the procedural staff and re-confirmed the patient s name, procedure, and site/side. (The Joint Commission universal protocol was followed.)  Yes    Sedation (Moderate or Deep): None    Skyla Ashby LPN on 9/20/2019 at 10:05 AM

## 2019-10-05 DIAGNOSIS — E78.5 HYPERLIPIDEMIA LDL GOAL <100: ICD-10-CM

## 2019-10-08 ENCOUNTER — OFFICE VISIT (OUTPATIENT)
Dept: SURGERY | Facility: OTHER | Age: 69
End: 2019-10-08
Attending: SURGERY
Payer: MEDICARE

## 2019-10-08 VITALS
TEMPERATURE: 97.4 F | SYSTOLIC BLOOD PRESSURE: 130 MMHG | BODY MASS INDEX: 35.86 KG/M2 | WEIGHT: 189.8 LBS | HEART RATE: 59 BPM | RESPIRATION RATE: 18 BRPM | DIASTOLIC BLOOD PRESSURE: 76 MMHG

## 2019-10-08 DIAGNOSIS — C44.311 BASAL CELL CARCINOMA (BCC) OF SKIN OF NOSE: Primary | ICD-10-CM

## 2019-10-08 PROCEDURE — G0463 HOSPITAL OUTPT CLINIC VISIT: HCPCS

## 2019-10-08 PROCEDURE — 11641 EXC F/E/E/N/L MAL+MRG 0.6-1: CPT | Performed by: SURGERY

## 2019-10-08 PROCEDURE — 88305 TISSUE EXAM BY PATHOLOGIST: CPT

## 2019-10-08 RX ORDER — SIMVASTATIN 10 MG
TABLET ORAL
Qty: 90 TABLET | Refills: 2 | Status: SHIPPED | OUTPATIENT
Start: 2019-10-08 | End: 2020-04-24

## 2019-10-08 ASSESSMENT — PAIN SCALES - GENERAL: PAINLEVEL: NO PAIN (0)

## 2019-10-08 NOTE — NURSING NOTE
"Chief Complaint   Patient presents with     Procedure     basal cell       Initial /76 (BP Location: Right arm, Patient Position: Sitting, Cuff Size: Adult Large)   Pulse 59   Temp 97.4  F (36.3  C) (Temporal)   Resp 18   Wt 86.1 kg (189 lb 12.8 oz)   Breastfeeding? No   BMI 35.86 kg/m   Estimated body mass index is 35.86 kg/m  as calculated from the following:    Height as of 9/13/19: 1.549 m (5' 1\").    Weight as of this encounter: 86.1 kg (189 lb 12.8 oz).  Medication Reconciliation: complete  TIMEOUT  Universal Protocol    A. Pre-procedure verification complete: yes   1-relevant information / documentation available, reviewed and properly matched to the patient; 2-consent accurate and complete, 3-equipment and supplies available    B. Site marking complete: N/A  Site marked if not in continuous attendance with patient    C. TIME OUT completed:  Yes  Time Out was conducted just prior to starting procedure to verify the eight required elements: 1-patient identity, 2-consent accurate and complete, 3-position, 4-correct side/site marked (if applicable), 5-procedure, 6-relevant images / results properly labeled and displayed (if applicable), 7-antibiotics / irrigation fluids (if applicable), 8-safety precautions.    Loulou Grant LPN  "

## 2019-10-08 NOTE — TELEPHONE ENCOUNTER
"Requested Prescriptions   Pending Prescriptions Disp Refills     simvastatin (ZOCOR) 10 MG tablet [Pharmacy Med Name: SIMVASTATIN 10 MG TABLET] 30 tablet 0     Sig: TAKE 1 TABLET BY MOUTH EVERY DAY       Statins Protocol Passed - 10/5/2019  9:01 AM        Passed - LDL on file in past 12 months     Recent Labs   Lab Test 09/13/19  0753   LDL 85             Passed - No abnormal creatine kinase in past 12 months     No lab results found.             Passed - Recent (12 mo) or future (30 days) visit within the authorizing provider's specialty     Patient has had an office visit with the authorizing provider or a provider within the authorizing providers department within the previous 12 mos or has a future within next 30 days. See \"Patient Info\" tab in inbasket, or \"Choose Columns\" in Meds & Orders section of the refill encounter.              Passed - Medication is active on med list        Passed - Patient is age 18 or older        Passed - No active pregnancy on record        Passed - No positive pregnancy test in past 12 months        LOV-09/13/2019    Future Office visit:    Next 5 appointments (look out 90 days)    Dec 12, 2019  9:00 AM CST  Office Visit with Luz Levin MD  [facility] [address]  [place] [id number] [phone]         Prescription refilled per RN Medication RefillPolicy.................... Kendra Romero RN ....................  10/8/2019   9:52 AM          "

## 2019-10-08 NOTE — PROGRESS NOTES
Procedure Note      Pre/Post Operative Diagnosis:   Basal cell carcinoma with positive margins    Procedure:   Removal of Basal cell carcinoma with positive margin    Surgeon: Nii Llamas MD    Local Anesthesia: 1% lidocaine with 0.25% Marcaine with epinephrine    Indication for the procedure:  This is a 69 year old female  patient referred by Luz Levin with a skin lesion that was basal cell carcinoma with positive margins in 2015.  Pathology shows Basal cell carcinoma with positive margins 3-6 and 6-9 o'clock.      After explaining the risks to include bleeding, infection, recurrence or need for reexcision, and scarring the patientwished to proceed.    Procedure:   The area was prepped and draped in usual sterile fashion with ChloraPrep.   After, adequate local anesthesia, an 1.6 cm X 0.6 cm elliptical skin incision was made to encompass the scar and additional margins lesion with margins.  The skin was closed with 5-0 Monocryl and Dermabond.      Plan:  The patient will be called to review the pathology. Patient will follow up if there any problems with the wound including redness or drainage.      Nii Llamas MD on 10/8/2019 at 10:28 AM

## 2019-10-10 ENCOUNTER — TELEPHONE (OUTPATIENT)
Dept: SURGERY | Facility: OTHER | Age: 69
End: 2019-10-10

## 2019-12-12 ENCOUNTER — OFFICE VISIT (OUTPATIENT)
Dept: FAMILY MEDICINE | Facility: OTHER | Age: 69
End: 2019-12-12
Attending: FAMILY MEDICINE
Payer: MEDICARE

## 2019-12-12 VITALS
WEIGHT: 190 LBS | HEIGHT: 61 IN | OXYGEN SATURATION: 98 % | TEMPERATURE: 96.8 F | BODY MASS INDEX: 35.87 KG/M2 | DIASTOLIC BLOOD PRESSURE: 70 MMHG | SYSTOLIC BLOOD PRESSURE: 122 MMHG | RESPIRATION RATE: 18 BRPM | HEART RATE: 64 BPM

## 2019-12-12 DIAGNOSIS — E78.5 HYPERLIPIDEMIA LDL GOAL <100: ICD-10-CM

## 2019-12-12 DIAGNOSIS — I10 ESSENTIAL HYPERTENSION: ICD-10-CM

## 2019-12-12 DIAGNOSIS — E78.00 PURE HYPERCHOLESTEROLEMIA: ICD-10-CM

## 2019-12-12 DIAGNOSIS — E11.9 TYPE 2 DIABETES MELLITUS WITHOUT COMPLICATION, WITHOUT LONG-TERM CURRENT USE OF INSULIN (H): Primary | ICD-10-CM

## 2019-12-12 DIAGNOSIS — E11.9 TYPE 2 DIABETES MELLITUS WITHOUT COMPLICATION, WITHOUT LONG-TERM CURRENT USE OF INSULIN (H): ICD-10-CM

## 2019-12-12 LAB
ALBUMIN SERPL-MCNC: 4.5 G/DL (ref 3.5–5.7)
ALP SERPL-CCNC: 55 U/L (ref 34–104)
ALT SERPL W P-5'-P-CCNC: 14 U/L (ref 7–52)
ANION GAP SERPL CALCULATED.3IONS-SCNC: 11 MMOL/L (ref 3–14)
AST SERPL W P-5'-P-CCNC: 17 U/L (ref 13–39)
BILIRUB SERPL-MCNC: 0.4 MG/DL (ref 0.3–1)
BUN SERPL-MCNC: 20 MG/DL (ref 7–25)
CALCIUM SERPL-MCNC: 9.5 MG/DL (ref 8.6–10.3)
CHLORIDE SERPL-SCNC: 103 MMOL/L (ref 98–107)
CO2 SERPL-SCNC: 26 MMOL/L (ref 21–31)
CREAT SERPL-MCNC: 0.98 MG/DL (ref 0.6–1.2)
CREAT UR-MCNC: 66 MG/DL
GFR SERPL CREATININE-BSD FRML MDRD: 56 ML/MIN/{1.73_M2}
GLUCOSE SERPL-MCNC: 143 MG/DL (ref 70–105)
HBA1C MFR BLD: 7.4 % (ref 4–6)
MICROALBUMIN UR-MCNC: 447 MG/L
MICROALBUMIN/CREAT UR: 678.3 MG/G CR (ref 0–25)
POTASSIUM SERPL-SCNC: 3.8 MMOL/L (ref 3.5–5.1)
PROT SERPL-MCNC: 7.1 G/DL (ref 6.4–8.9)
SODIUM SERPL-SCNC: 140 MMOL/L (ref 134–144)

## 2019-12-12 PROCEDURE — 83036 HEMOGLOBIN GLYCOSYLATED A1C: CPT | Mod: ZL | Performed by: FAMILY MEDICINE

## 2019-12-12 PROCEDURE — G0463 HOSPITAL OUTPT CLINIC VISIT: HCPCS

## 2019-12-12 PROCEDURE — 80053 COMPREHEN METABOLIC PANEL: CPT | Mod: ZL | Performed by: FAMILY MEDICINE

## 2019-12-12 PROCEDURE — 99213 OFFICE O/P EST LOW 20 MIN: CPT | Performed by: FAMILY MEDICINE

## 2019-12-12 PROCEDURE — 36415 COLL VENOUS BLD VENIPUNCTURE: CPT | Mod: ZL | Performed by: FAMILY MEDICINE

## 2019-12-12 PROCEDURE — 82043 UR ALBUMIN QUANTITATIVE: CPT | Mod: ZL | Performed by: FAMILY MEDICINE

## 2019-12-12 ASSESSMENT — MIFFLIN-ST. JEOR: SCORE: 1319.21

## 2019-12-12 ASSESSMENT — PAIN SCALES - GENERAL: PAINLEVEL: NO PAIN (0)

## 2019-12-12 NOTE — NURSING NOTE
"Chief Complaint   Patient presents with     Diabetes     Previous A1C is at goal of <8  Lab Results   Component Value Date    A1C 7.4 12/12/2019    A1C 7.1 09/13/2019    A1C 7.4 06/13/2019    A1C 8.5 03/15/2019    A1C 7.3 12/17/2018     Urine microalbumin:creatine: 0  Foot exam 09/13/19  Eye exam 01/11/19    Tobacco User no  Patient is on a daily aspirin  Patient is on a Statin.  Blood pressure today of:     BP Readings from Last 1 Encounters:   12/12/19 122/70      is at the goal of <139/89 for diabetics.    Carolina Pierre LPN on 12/12/2019 at 8:48 AM      Initial /70 (BP Location: Right arm, Patient Position: Sitting, Cuff Size: Adult Large)   Pulse 64   Temp 96.8  F (36  C) (Tympanic)   Resp 18   Ht 1.549 m (5' 1\")   Wt 86.2 kg (190 lb)   SpO2 98%   BMI 35.90 kg/m   Estimated body mass index is 35.9 kg/m  as calculated from the following:    Height as of this encounter: 1.549 m (5' 1\").    Weight as of this encounter: 86.2 kg (190 lb).  Medication Reconciliation: complete    Carolina Pierre LPN  "

## 2019-12-13 ASSESSMENT — ENCOUNTER SYMPTOMS
FEVER: 0
CHILLS: 0
COUGH: 0
SHORTNESS OF BREATH: 0
NERVOUS/ANXIOUS: 0

## 2019-12-16 DIAGNOSIS — E11.9 TYPE 2 DIABETES MELLITUS WITHOUT COMPLICATION, WITHOUT LONG-TERM CURRENT USE OF INSULIN (H): Primary | ICD-10-CM

## 2020-03-03 ENCOUNTER — TRANSFERRED RECORDS (OUTPATIENT)
Dept: HEALTH INFORMATION MANAGEMENT | Facility: OTHER | Age: 70
End: 2020-03-03

## 2020-03-03 LAB — RETINOPATHY: NORMAL

## 2020-03-10 ENCOUNTER — HEALTH MAINTENANCE LETTER (OUTPATIENT)
Age: 70
End: 2020-03-10

## 2020-03-12 ENCOUNTER — OFFICE VISIT (OUTPATIENT)
Dept: FAMILY MEDICINE | Facility: OTHER | Age: 70
End: 2020-03-12
Attending: FAMILY MEDICINE
Payer: COMMERCIAL

## 2020-03-12 ENCOUNTER — TELEPHONE (OUTPATIENT)
Dept: FAMILY MEDICINE | Facility: OTHER | Age: 70
End: 2020-03-12

## 2020-03-12 VITALS
DIASTOLIC BLOOD PRESSURE: 80 MMHG | TEMPERATURE: 97.3 F | HEIGHT: 61 IN | HEART RATE: 78 BPM | BODY MASS INDEX: 36.25 KG/M2 | SYSTOLIC BLOOD PRESSURE: 124 MMHG | WEIGHT: 192 LBS | OXYGEN SATURATION: 96 % | RESPIRATION RATE: 18 BRPM

## 2020-03-12 DIAGNOSIS — E78.5 HYPERLIPIDEMIA LDL GOAL <100: ICD-10-CM

## 2020-03-12 DIAGNOSIS — E11.9 TYPE 2 DIABETES MELLITUS WITHOUT COMPLICATION, WITHOUT LONG-TERM CURRENT USE OF INSULIN (H): ICD-10-CM

## 2020-03-12 DIAGNOSIS — E11.9 TYPE 2 DIABETES MELLITUS WITHOUT COMPLICATION, WITHOUT LONG-TERM CURRENT USE OF INSULIN (H): Primary | ICD-10-CM

## 2020-03-12 DIAGNOSIS — I10 ESSENTIAL HYPERTENSION: ICD-10-CM

## 2020-03-12 DIAGNOSIS — E03.9 HYPOTHYROIDISM, UNSPECIFIED TYPE: ICD-10-CM

## 2020-03-12 LAB
ALBUMIN SERPL-MCNC: 4.4 G/DL (ref 3.5–5.7)
ALP SERPL-CCNC: 61 U/L (ref 34–104)
ALT SERPL W P-5'-P-CCNC: 16 U/L (ref 7–52)
ANION GAP SERPL CALCULATED.3IONS-SCNC: 10 MMOL/L (ref 3–14)
AST SERPL W P-5'-P-CCNC: 15 U/L (ref 13–39)
BILIRUB SERPL-MCNC: 0.4 MG/DL (ref 0.3–1)
BUN SERPL-MCNC: 19 MG/DL (ref 7–25)
CALCIUM SERPL-MCNC: 9.7 MG/DL (ref 8.6–10.3)
CHLORIDE SERPL-SCNC: 100 MMOL/L (ref 98–107)
CHOLEST SERPL-MCNC: 205 MG/DL
CO2 SERPL-SCNC: 30 MMOL/L (ref 21–31)
CREAT SERPL-MCNC: 1.01 MG/DL (ref 0.6–1.2)
CREAT UR-MCNC: 36 MG/DL
GFR SERPL CREATININE-BSD FRML MDRD: 54 ML/MIN/{1.73_M2}
GLUCOSE SERPL-MCNC: 159 MG/DL (ref 70–105)
HBA1C MFR BLD: 8 % (ref 4–6)
HDLC SERPL-MCNC: 86 MG/DL (ref 23–92)
LDLC SERPL CALC-MCNC: 79 MG/DL
MICROALBUMIN UR-MCNC: 20 MG/L
MICROALBUMIN/CREAT UR: 54.25 MG/G CR (ref 0–25)
NONHDLC SERPL-MCNC: 119 MG/DL
POTASSIUM SERPL-SCNC: 3.7 MMOL/L (ref 3.5–5.1)
PROT SERPL-MCNC: 7.1 G/DL (ref 6.4–8.9)
SODIUM SERPL-SCNC: 140 MMOL/L (ref 134–144)
TRIGL SERPL-MCNC: 198 MG/DL
TSH SERPL DL<=0.05 MIU/L-ACNC: 8.74 IU/ML (ref 0.34–5.6)

## 2020-03-12 PROCEDURE — 82043 UR ALBUMIN QUANTITATIVE: CPT | Mod: ZL | Performed by: FAMILY MEDICINE

## 2020-03-12 PROCEDURE — 80061 LIPID PANEL: CPT | Mod: ZL | Performed by: FAMILY MEDICINE

## 2020-03-12 PROCEDURE — 84443 ASSAY THYROID STIM HORMONE: CPT | Mod: ZL | Performed by: FAMILY MEDICINE

## 2020-03-12 PROCEDURE — 36415 COLL VENOUS BLD VENIPUNCTURE: CPT | Mod: ZL | Performed by: FAMILY MEDICINE

## 2020-03-12 PROCEDURE — G0463 HOSPITAL OUTPT CLINIC VISIT: HCPCS

## 2020-03-12 PROCEDURE — 80053 COMPREHEN METABOLIC PANEL: CPT | Mod: ZL | Performed by: FAMILY MEDICINE

## 2020-03-12 PROCEDURE — 99213 OFFICE O/P EST LOW 20 MIN: CPT | Performed by: FAMILY MEDICINE

## 2020-03-12 PROCEDURE — 83036 HEMOGLOBIN GLYCOSYLATED A1C: CPT | Mod: ZL | Performed by: FAMILY MEDICINE

## 2020-03-12 RX ORDER — SEMAGLUTIDE 1.34 MG/ML
0.25 INJECTION, SOLUTION SUBCUTANEOUS WEEKLY
Qty: 1.5 ML | Refills: 11 | Status: SHIPPED | OUTPATIENT
Start: 2020-03-12

## 2020-03-12 RX ORDER — LEVOTHYROXINE SODIUM 25 UG/1
25 TABLET ORAL DAILY
Qty: 30 TABLET | Refills: 11 | Status: SHIPPED | OUTPATIENT
Start: 2020-03-12 | End: 2020-04-17

## 2020-03-12 ASSESSMENT — ENCOUNTER SYMPTOMS
FEVER: 0
COUGH: 0
NERVOUS/ANXIOUS: 0
CHILLS: 0

## 2020-03-12 ASSESSMENT — PAIN SCALES - GENERAL: PAINLEVEL: NO PAIN (0)

## 2020-03-12 ASSESSMENT — MIFFLIN-ST. JEOR: SCORE: 1320.35

## 2020-03-12 NOTE — TELEPHONE ENCOUNTER
Talked to the patient and she is okay with what she has as the real cost was $800 and was fine paying $47 . Carolina Pierre LPN ....................3/12/2020  3:26 PM

## 2020-03-12 NOTE — TELEPHONE ENCOUNTER
"I didn't say there wouldn't be a copay, but told her that it looked like this medication is a \"Preferred Option\" with \"Quantity limits.\"  I cannot tell what the less expensive options on her formulary are as I don't have access to that info.  Would she be able to contact her insurance to get a copy of the formulary options for diabetes sent to us OR would pharmacy be able to let us know what a more reasonable option might be with her plan?  The other alternatives I am typing into her chart to try are giving the same \"Preferred Option with Quantity Limits\" designation, so I'm guessing they would be just as expensive for her as the Ozempic.  Luz Levin MD   "

## 2020-03-12 NOTE — PROGRESS NOTES
"  SUBJECTIVE:   Nursing Notes:   Carolina Pierre LPN  3/12/2020  8:49 AM  Sign at exiting of workspace  Chief Complaint   Patient presents with     Diabetes     Previous A1C is at goal of <8  Lab Results   Component Value Date    A1C 8.0 2020    A1C 7.4 2019    A1C 7.1 2019    A1C 7.4 2019    A1C 8.5 03/15/2019     Urine microalbumin:creatine: 0  Foot exam 1919  Eye exam 2020    Tobacco User no  Patient is on a daily aspirin  Patient is on a Statin.  Blood pressure today of:     BP Readings from Last 1 Encounters:   20 124/80      is at the goal of <139/89 for diabetics.    Carolina Pierre LPN on 3/12/2020 at 8:48 AM      Initial /80 (BP Location: Right arm, Patient Position: Sitting, Cuff Size: Adult Large)   Pulse 78   Temp 97.3  F (36.3  C) (Tympanic)   Resp 18   Ht 1.537 m (5' 0.5\")   Wt 87.1 kg (192 lb)   SpO2 96%   BMI 36.88 kg/m   Estimated body mass index is 36.88 kg/m  as calculated from the following:    Height as of this encounter: 1.537 m (5' 0.5\").    Weight as of this encounter: 87.1 kg (192 lb).  Medication Reconciliation: complete    Carolina Pierre LPN    Karen Vargas is a 69 year old female who presents to clinic today for a diabetes check.        Diabetes:   She presents for follow up of diabetes.  She is checking home blood glucose one time daily. She checks blood glucose before meals.  Blood glucose is never over 200 and never under 70. She is aware of hypoglycemia symptoms including shakiness, dizziness and weakness. She has no concerns regarding her diabetes at this time.  She is not experiencing numbness or burning in feet, excessive thirst, blurry vision, weight changes or redness, sores or blisters on feet. The patient has had a diabetic eye exam in the last 12 months. Eye exam performed on 3/6/2020.     Diabetes comment:  Fastin today at home.  Usually 125 in the morning.      I personally reviewed " "medications/allergies/history listed below:    Patient Active Problem List    Diagnosis Date Noted     Basal cell carcinoma (BCC) of skin of nose 10/08/2019     Priority: Medium     Blepharoconjunctivitis of both eyes 03/15/2019     Priority: Medium     Cataract extraction status 03/15/2019     Priority: Medium     Age-related nuclear cataract, bilateral 03/15/2019     Priority: Medium     Other specified postprocedural states 03/15/2019     Priority: Medium     Allergic rhinitis due to pollen 01/25/2018     Priority: Medium     Diabetes mellitus, type II (H) 01/25/2018     Priority: Medium     Overview:   Type 2 diabetes mellitus, insulin instituted 12/99, stopped agents after   gastric bypass 1/02.  Oral agents restarted 6/04 with increased weight.       Hyperlipidemia 01/25/2018     Priority: Medium     Hypertension 01/25/2018     Priority: Medium     Pain in joint, pelvic region and thigh 01/12/2011     Priority: Medium     Obesity 03/16/2010     Priority: Medium     Overview:   Height5'1  \"BMI09/07 - 38  Xohsdh58/22/09, 205Abd CircGreater than 35\"       Past Medical History:   Diagnosis Date     Basal cell carcinoma of skin of nose     2/25/2015     Colon cancer screening 12/28/2018    FIT test neg     Personal history of other medical treatment (CODE)     G2, P2-0-0-2     Personal history of other medical treatment (CODE)     History of morbid obesity, weight loss on byetta     Type 2 diabetes mellitus without complications (H)     No Comments Provided      Past Surgical History:   Procedure Laterality Date     ARTHROSCOPY KNEE      2008     COLONOSCOPY      12/6/2006,10 year f/u     ENHANCE LASER REFRACTIVE NEW PT IN PARAMETERS      07/2000,LASIK refractive eye surgery 7/00     LAPAROSCOPIC BYPASS GASTRIC      Silvestre-en-Y gastric bypass with incidental cholecystectomy and umbilical hernia repair     LAPAROSCOPIC CHOLECYSTECTOMY      No Comments Provided     OTHER SURGICAL HISTORY      ,HERNIA REPAIR, " umbilical hernia repair     OTHER SURGICAL HISTORY      6/15/16,OIN279,CATARACT EXTRACTION W/  INTRAOCULAR LENS IMPLANT,Left     RELEASE CARPAL TUNNEL      2000, 2000,Laparoscopic carpal tunnel release right hand, left hand     Family History   Problem Relation Age of Onset     Cancer Father         Cancer,lung cancer ,      Heart Disease Mother         Heart Disease,Coronary artery disease,      Colon Cancer Mother         Cancer-colon,bladder cancer, colon cancer, metastatic to liver, colon cancer     Breast Cancer Sister         Cancer-breast,metastatic breast cancer at age 63     Cancer Sister         Cancer,one  of uterine cancer age 48     Cancer Sister         Cancer,one  of bone cancer age 46.     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   - Georgi - metastatic pancreatic cancer,  2012.  Peña 72 son  Tsering 10/4/74 daughter  grandson - Barry     Current Outpatient Medications   Medication Sig Dispense Refill     aspirin EC 81 MG EC tablet Take 81 mg by mouth daily with food       blood glucose (NO BRAND SPECIFIED) test strip Use to test blood sugar one time daily or as directed. 100 each 3     blood glucose monitoring (SOFTCLIX) lancets Use to check blood sugars once daily and as needed.  Diagnosis:  E11.9. 100 each 3     Blood Glucose Monitoring Suppl (FIFTY50 GLUCOSE  METER 2.0) W/DEVICE KIT Accu chek Lenora Plus meter.  E11.9 NIDDM type II - Test 1 time/day.       fish oil-omega-3 fatty acids 1000 MG capsule Take 1 capsule by mouth daily       glucosamine sulfate 1000 MG CAPS Take 2,000 mg by mouth daily       insulin pen needle (32G X 4 MM) 32G X 4 MM miscellaneous Use 1 pen needles weekly or as directed for ozempic administration. 20 each 11     IRON PO Take 325 mg by mouth daily with food       levothyroxine (SYNTHROID/LEVOTHROID) 25 MCG tablet Take 1 tablet (25 mcg) by mouth daily 30 tablet 11     lisinopril (PRINIVIL/ZESTRIL) 2.5 MG tablet Take 1 tablet (2.5 mg) by mouth daily 90 tablet 3     loratadine (CLARITIN) 10 MG tablet TAKE 1 TABLET BY MOUTH EVERY DAY 90 tablet 1     metFORMIN (GLUCOPHAGE) 1000 MG tablet Take 1 tablet (1,000 mg) by mouth 2 times daily (with meals) 180 tablet 3     metoprolol tartrate (LOPRESSOR) 50 MG tablet Take 0.5 tablets (25 mg) by mouth 2 times daily 90 tablet 3     Multiple Vitamin (MULTI-VITAMINS) TABS Take 1 tablet by mouth daily       pioglitazone (ACTOS) 45 MG tablet Take 1 tablet (45 mg) by mouth daily 90 tablet 3     Semaglutide,0.25 or 0.5MG/DOS, (OZEMPIC, 0.25 OR 0.5 MG/DOSE,) 2 MG/1.5ML SOPN Inject 0.25 mg Subcutaneous once a week 1.5 mL 11     simvastatin (ZOCOR) 10 MG tablet TAKE 1 TABLET BY MOUTH EVERY DAY 90 tablet 2     triamterene-HCTZ (MAXZIDE-25) 37.5-25 MG tablet Take 0.5 tablets by mouth daily 45 tablet 3     vitamin B-12 (CYANOCOBALAMIN) 1000 MCG/ML injection INJECT 1ML INTRAMUSCULARLY EVERY 4 WEEKS. 3 mL 3     Allergies   Allergen Reactions     Cefaclor Rash       Review of Systems   Constitutional: Negative for chills and fever.   Respiratory: Negative for cough.    Cardiovascular: Negative for peripheral edema.   Psychiatric/Behavioral: Negative for mood changes. The patient is not nervous/anxious.         OBJECTIVE:     /80 (BP Location: Right arm, Patient Position: Sitting, Cuff Size: Adult Large)   Pulse 78    "Temp 97.3  F (36.3  C) (Tympanic)   Resp 18   Ht 1.537 m (5' 0.5\")   Wt 87.1 kg (192 lb)   SpO2 96%   BMI 36.88 kg/m    Body mass index is 36.88 kg/m .  Physical Exam  Constitutional:       Appearance: She is well-developed.   HENT:      Head: Normocephalic.   Eyes:      Pupils: Pupils are equal, round, and reactive to light.   Neck:      Musculoskeletal: Normal range of motion.   Cardiovascular:      Rate and Rhythm: Normal rate and regular rhythm.      Heart sounds: Normal heart sounds. No murmur.   Pulmonary:      Effort: Pulmonary effort is normal. No respiratory distress.      Breath sounds: Normal breath sounds. No wheezing or rales.   Skin:     General: Skin is warm.      Comments: No lesions on feet.   Neurological:      Mental Status: She is alert and oriented to person, place, and time.      Comments: Normal sensation with monofilament exam bilaterally.           PHQ-9 SCORE 6/21/2016 9/27/2016 12/27/2016   PHQ-9 Total Score 0 0 0       PHQ-2 Score:     PHQ-2 ( 1999 Pfizer) 3/12/2020 3/12/2020   Q1: Little interest or pleasure in doing things 0 0   Q2: Feeling down, depressed or hopeless 0 0   PHQ-2 Score 0 0       I personally reviewed results withpatient as listed below:   Diagnostic Test Results:  Results for orders placed or performed in visit on 03/12/20 (from the past 24 hour(s))   Lipid Panel   Result Value Ref Range    Cholesterol 205 (H) <200 mg/dL    Triglycerides 198 (H) <150 mg/dL    HDL Cholesterol 86 23 - 92 mg/dL    LDL Cholesterol Calculated 79 <100 mg/dL    Non HDL Cholesterol 119 <130 mg/dL   Thyrotropin GH   Result Value Ref Range    Thyrotropin 8.74 (H) 0.34 - 5.60 IU/mL   Comprehensive Metabolic Panel   Result Value Ref Range    Sodium 140 134 - 144 mmol/L    Potassium 3.7 3.5 - 5.1 mmol/L    Chloride 100 98 - 107 mmol/L    Carbon Dioxide 30 21 - 31 mmol/L    Anion Gap 10 3 - 14 mmol/L    Glucose 159 (H) 70 - 105 mg/dL    Urea Nitrogen 19 7 - 25 mg/dL    Creatinine 1.01 0.60 - " 1.20 mg/dL    GFR Estimate 54 (L) >60 mL/min/[1.73_m2]    GFR Estimate If Black 66 >60 mL/min/[1.73_m2]    Calcium 9.7 8.6 - 10.3 mg/dL    Bilirubin Total 0.4 0.3 - 1.0 mg/dL    Albumin 4.4 3.5 - 5.7 g/dL    Protein Total 7.1 6.4 - 8.9 g/dL    Alkaline Phosphatase 61 34 - 104 U/L    ALT 16 7 - 52 U/L    AST 15 13 - 39 U/L   Hemoglobin A1c   Result Value Ref Range    Hemoglobin A1C 8.0 (H) 4.0 - 6.0 %       ASSESSMENT/PLAN:       ICD-10-CM    1. Type 2 diabetes mellitus without complication, without long-term current use of insulin (H)  E11.9 Semaglutide,0.25 or 0.5MG/DOS, (OZEMPIC, 0.25 OR 0.5 MG/DOSE,) 2 MG/1.5ML SOPN     insulin pen needle (32G X 4 MM) 32G X 4 MM miscellaneous   2. Essential hypertension  I10    3. Hyperlipidemia LDL goal <100  E78.5    4. Hypothyroidism, unspecified type  E03.9 levothyroxine (SYNTHROID/LEVOTHROID) 25 MCG tablet     TSH Reflex GH       1.  Stable.  A1c is still a little high.  Will add Ozempic 0.25 mg subcutaneously weekly. Blood pressure is well controlled. Eye exam is up to date.  On aspirin daily.  Non-smoker.  On ACE-I and statin.  Follow up in 3 months.  2.  Blood pressure stable as above.    3.  Lipids stable.  On Simvastatin.  4.  TSH is elevated.  Will start on levothyroxine 25 mcg daily.  Will recommend repeat TSH in approximately 1 month.      Luz Levin MD  [facility] [place]    Portions of this dictation were created using the Dragon Nuance voice recognition system. Proofreading was completed but there may be errors in text.

## 2020-03-12 NOTE — TELEPHONE ENCOUNTER
Received wagner from [facility] and was told that the patient had a $47 co pay . The patient said the doctor told her there would be no co pay. Carolina Pierre LPN ....................3/12/2020  10:54 AM

## 2020-03-12 NOTE — NURSING NOTE
"Chief Complaint   Patient presents with     Diabetes     Previous A1C is at goal of <8  Lab Results   Component Value Date    A1C 8.0 03/12/2020    A1C 7.4 12/12/2019    A1C 7.1 09/13/2019    A1C 7.4 06/13/2019    A1C 8.5 03/15/2019     Urine microalbumin:creatine: 0  Foot exam 12/1919  Eye exam 03/06/2020    Tobacco User no  Patient is on a daily aspirin  Patient is on a Statin.  Blood pressure today of:     BP Readings from Last 1 Encounters:   03/12/20 124/80      is at the goal of <139/89 for diabetics.    Carolina Pierre LPN on 3/12/2020 at 8:48 AM      Initial /80 (BP Location: Right arm, Patient Position: Sitting, Cuff Size: Adult Large)   Pulse 78   Temp 97.3  F (36.3  C) (Tympanic)   Resp 18   Ht 1.537 m (5' 0.5\")   Wt 87.1 kg (192 lb)   SpO2 96%   BMI 36.88 kg/m   Estimated body mass index is 36.88 kg/m  as calculated from the following:    Height as of this encounter: 1.537 m (5' 0.5\").    Weight as of this encounter: 87.1 kg (192 lb).  Medication Reconciliation: complete    Carolina Pierre LPN  "

## 2020-04-10 DIAGNOSIS — E03.9 HYPOTHYROIDISM, UNSPECIFIED TYPE: ICD-10-CM

## 2020-04-10 DIAGNOSIS — E53.8 VITAMIN B12 DEFICIENCY (NON ANEMIC): ICD-10-CM

## 2020-04-10 LAB — TSH SERPL DL<=0.05 MIU/L-ACNC: 2.82 IU/ML (ref 0.34–5.6)

## 2020-04-10 PROCEDURE — 36415 COLL VENOUS BLD VENIPUNCTURE: CPT | Mod: ZL | Performed by: FAMILY MEDICINE

## 2020-04-10 PROCEDURE — 84443 ASSAY THYROID STIM HORMONE: CPT | Mod: ZL | Performed by: FAMILY MEDICINE

## 2020-04-10 RX ORDER — CYANOCOBALAMIN 1000 UG/ML
INJECTION, SOLUTION INTRAMUSCULAR; SUBCUTANEOUS
Qty: 3 ML | Refills: 3 | Status: SHIPPED | OUTPATIENT
Start: 2020-04-10 | End: 2020-04-17

## 2020-04-10 NOTE — TELEPHONE ENCOUNTER
Refill Request for: Vitamin B-12 (CYANOCOBALAMIN) 1000 MCG/ML injection    Received From: [id number]  Last Written Prescription Date: 1/15/2019 for 3 mL and 3 refills  LOV: 3/12/2020 with PCP  Next Appointment: 6/11/2020 with PCP  Protocol: Vitamin Supplements (Adult) Protocol Passed - 04/10/2020 11:08 AM    Prescription approved per [facility] Medication Ref[place] Protocol.      Tamara NINON, RN on 4/10/2020 at 11:11 AM

## 2020-04-16 ENCOUNTER — TELEPHONE (OUTPATIENT)
Dept: FAMILY MEDICINE | Facility: OTHER | Age: 70
End: 2020-04-16

## 2020-04-16 NOTE — TELEPHONE ENCOUNTER
"Fax received from [facility] requesting refills for the following: Accu-Chek Lenora Plus Meter; Accu-Chek Lenora Plus Test Strip; Accu-Chek Softclix Lancets; BD Ultra-Fine Sanjuana Pen Needle 32 gauge x 5/32\"; Vit B-12 1000 mcg/mL injection solution; Levothyroxine 25 mcg tablet; BD single use swab; Accu-Chek Lenora Solution.   "

## 2020-04-17 DIAGNOSIS — E53.8 VITAMIN B12 DEFICIENCY (NON ANEMIC): ICD-10-CM

## 2020-04-17 DIAGNOSIS — E11.9 TYPE 2 DIABETES MELLITUS WITHOUT COMPLICATION, WITHOUT LONG-TERM CURRENT USE OF INSULIN (H): Primary | ICD-10-CM

## 2020-04-17 DIAGNOSIS — E03.9 HYPOTHYROIDISM, UNSPECIFIED TYPE: ICD-10-CM

## 2020-04-17 RX ORDER — LANCING DEVICE/LANCETS
KIT MISCELLANEOUS
Qty: 1 EACH | Refills: 0 | Status: SHIPPED | OUTPATIENT
Start: 2020-04-17

## 2020-04-17 RX ORDER — BLOOD-GLUCOSE METER
EACH MISCELLANEOUS
Qty: 1 KIT | Refills: 0 | Status: SHIPPED | OUTPATIENT
Start: 2020-04-17

## 2020-04-17 RX ORDER — LANCETS
EACH MISCELLANEOUS
Qty: 100 EACH | Refills: 3 | Status: SHIPPED | OUTPATIENT
Start: 2020-04-17

## 2020-04-17 RX ORDER — GLUCOSAMINE HCL/CHONDROITIN SU 500-400 MG
CAPSULE ORAL
Qty: 100 EACH | Refills: 6 | Status: SHIPPED | OUTPATIENT
Start: 2020-04-17

## 2020-04-17 RX ORDER — CYANOCOBALAMIN 1000 UG/ML
INJECTION, SOLUTION INTRAMUSCULAR; SUBCUTANEOUS
Qty: 3 ML | Refills: 3 | Status: SHIPPED | OUTPATIENT
Start: 2020-04-17

## 2020-04-17 RX ORDER — LEVOTHYROXINE SODIUM 25 UG/1
25 TABLET ORAL DAILY
Qty: 90 TABLET | Refills: 3 | Status: SHIPPED | OUTPATIENT
Start: 2020-04-17 | End: 2021-02-12

## 2020-04-17 NOTE — TELEPHONE ENCOUNTER
"Per telephone note sent to [facility] on 04/16/2020.  Fax received from [employer] requesting refills for the following: Accu-Chek Lenora Plus Meter; Accu-Chek Lenora Plus Test Strip; Accu-Chek Softclix Lancets; BD Ultra-Fine Sanjuana Pen Needle 32 gauge x 5/32\"; Vit B-12 1000 mcg/mL injection solution; Levothyroxine 25 mcg tablet; BD single use swab; Accu-Chek Lenora Solution.       DM supplies, Levothyroxine, Vitamin B 12 injection,   LOV-03/12/2020  04/10/2020 TSH 2.82  Future Office visit:    Next 5 appointments (look out 90 days)    Jun 11, 2020  9:20 AM CDT  SHORT with Luz Levin MD  [facility] ([facility]) [address]  [place] [address]  [phone]           Routing refill request to provider for review/approval.     Unable to complete prescription refill per RNMedication Refill Policy.................... Kendra Romero RN ....................  4/17/2020   8:25 AM          "

## 2020-04-17 NOTE — TELEPHONE ENCOUNTER
PCP is out of clinic. Will route to teamlet for consideration. Kendra Romero RN on 4/17/2020 at 8:26 AM

## 2020-04-23 ENCOUNTER — TELEPHONE (OUTPATIENT)
Dept: FAMILY MEDICINE | Facility: OTHER | Age: 70
End: 2020-04-23

## 2020-04-24 ENCOUNTER — MYC MEDICAL ADVICE (OUTPATIENT)
Dept: FAMILY MEDICINE | Facility: OTHER | Age: 70
End: 2020-04-24

## 2020-04-24 DIAGNOSIS — I10 ESSENTIAL HYPERTENSION: ICD-10-CM

## 2020-04-24 DIAGNOSIS — E11.9 TYPE 2 DIABETES MELLITUS WITHOUT COMPLICATION, WITHOUT LONG-TERM CURRENT USE OF INSULIN (H): ICD-10-CM

## 2020-04-24 DIAGNOSIS — E78.5 HYPERLIPIDEMIA LDL GOAL <100: ICD-10-CM

## 2020-04-24 DIAGNOSIS — E11.9 TYPE 2 DIABETES MELLITUS WITHOUT COMPLICATION, WITHOUT LONG-TERM CURRENT USE OF INSULIN (H): Primary | ICD-10-CM

## 2020-04-24 RX ORDER — SIMVASTATIN 10 MG
10 TABLET ORAL DAILY
Qty: 90 TABLET | Refills: 2 | Status: SHIPPED | OUTPATIENT
Start: 2020-04-24 | End: 2021-07-07

## 2020-04-24 RX ORDER — PIOGLITAZONEHYDROCHLORIDE 45 MG/1
45 TABLET ORAL DAILY
Qty: 90 TABLET | Refills: 3 | Status: SHIPPED | OUTPATIENT
Start: 2020-04-24 | End: 2020-06-03

## 2020-04-24 RX ORDER — TRIAMTERENE/HYDROCHLOROTHIAZID 37.5-25 MG
0.5 TABLET ORAL DAILY
Qty: 45 TABLET | Refills: 3 | Status: SHIPPED | OUTPATIENT
Start: 2020-04-24

## 2020-04-24 RX ORDER — NEEDLES, FILTER 19GX1 1/2"
1 NEEDLE, DISPOSABLE MISCELLANEOUS
Qty: 12 EACH | Refills: 0 | Status: SHIPPED | OUTPATIENT
Start: 2020-04-24

## 2020-04-24 NOTE — TELEPHONE ENCOUNTER
[facility] sent Rx request for the following:      pioglitazone (ACTOS) 45 MG tablet   Sig: Take 1 tablet (45 mg) by mouth daily       Last Prescription Date:   6/13/2019  Last Fill Qty/Refills:         90, R-3    Last Office Visit:              3/12/2020   Future Office visit:           6/11/2020       simvastatin (ZOCOR) 10 MG tablet   Sig: Take 1 tablet (10 mg) by mouth daily      Last Prescription Date:   10/18/2019  Last Fill Qty/Refills:         90, R-2         triamterene-HCTZ (MAXZIDE-25) 37.5-25 MG tablet   Sig: Take 0.5 tablets by mouth daily     Last Prescription Date:   9/13/2019  Last Fill Qty/Refills:         45, R-3        Prescription refilled per RN Medication Refill Policy.................... Justus Torres RN ....................  4/24/2020   2:27 PM

## 2020-06-03 DIAGNOSIS — E11.9 TYPE 2 DIABETES MELLITUS WITHOUT COMPLICATION, WITHOUT LONG-TERM CURRENT USE OF INSULIN (H): ICD-10-CM

## 2020-06-03 RX ORDER — PIOGLITAZONEHYDROCHLORIDE 45 MG/1
45 TABLET ORAL DAILY
Qty: 90 TABLET | Refills: 1 | Status: SHIPPED | OUTPATIENT
Start: 2020-06-03 | End: 2021-02-02

## 2020-06-03 NOTE — TELEPHONE ENCOUNTER
[facility] sent Rx request for the following:      PIOGLITAZONE HCL 45 MG TABLET   Sig: Take 1 tablet (45 mg) by mouth daily      Last Prescription Date:   4/24/2020  Last Fill Qty/Refills:         90, R-3    Last Office Visit:              3/12/2020   Future Office visit:           6/11/2020      Patient would like to get from a different pharmacy.  Prescription refilled per RN Medication Refill Policy.................... Justus Torres RN ....................  6/3/2020   11:51 AM

## 2020-06-11 ENCOUNTER — OFFICE VISIT (OUTPATIENT)
Dept: FAMILY MEDICINE | Facility: OTHER | Age: 70
End: 2020-06-11
Attending: FAMILY MEDICINE
Payer: COMMERCIAL

## 2020-06-11 VITALS
OXYGEN SATURATION: 99 % | HEART RATE: 64 BPM | DIASTOLIC BLOOD PRESSURE: 78 MMHG | TEMPERATURE: 97.2 F | RESPIRATION RATE: 18 BRPM | WEIGHT: 180.13 LBS | HEIGHT: 61 IN | BODY MASS INDEX: 34.01 KG/M2 | SYSTOLIC BLOOD PRESSURE: 128 MMHG

## 2020-06-11 DIAGNOSIS — E78.5 HYPERLIPIDEMIA LDL GOAL <100: ICD-10-CM

## 2020-06-11 DIAGNOSIS — E11.9 TYPE 2 DIABETES MELLITUS WITHOUT COMPLICATION, WITHOUT LONG-TERM CURRENT USE OF INSULIN (H): Primary | ICD-10-CM

## 2020-06-11 DIAGNOSIS — I10 ESSENTIAL HYPERTENSION: ICD-10-CM

## 2020-06-11 DIAGNOSIS — E11.9 TYPE 2 DIABETES MELLITUS WITHOUT COMPLICATION, WITHOUT LONG-TERM CURRENT USE OF INSULIN (H): ICD-10-CM

## 2020-06-11 LAB
ALBUMIN SERPL-MCNC: 4.4 G/DL (ref 3.5–5.7)
ALP SERPL-CCNC: 61 U/L (ref 34–104)
ALT SERPL W P-5'-P-CCNC: 14 U/L (ref 7–52)
ANION GAP SERPL CALCULATED.3IONS-SCNC: 10 MMOL/L (ref 3–14)
AST SERPL W P-5'-P-CCNC: 15 U/L (ref 13–39)
BILIRUB SERPL-MCNC: 0.5 MG/DL (ref 0.3–1)
BUN SERPL-MCNC: 18 MG/DL (ref 7–25)
CALCIUM SERPL-MCNC: 9.8 MG/DL (ref 8.6–10.3)
CHLORIDE SERPL-SCNC: 104 MMOL/L (ref 98–107)
CO2 SERPL-SCNC: 30 MMOL/L (ref 21–31)
CREAT SERPL-MCNC: 1 MG/DL (ref 0.6–1.2)
GFR SERPL CREATININE-BSD FRML MDRD: 55 ML/MIN/{1.73_M2}
GLUCOSE SERPL-MCNC: 128 MG/DL (ref 70–105)
HBA1C MFR BLD: 6.5 % (ref 4–6)
POTASSIUM SERPL-SCNC: 3.9 MMOL/L (ref 3.5–5.1)
PROT SERPL-MCNC: 7.3 G/DL (ref 6.4–8.9)
SODIUM SERPL-SCNC: 144 MMOL/L (ref 134–144)

## 2020-06-11 PROCEDURE — 83036 HEMOGLOBIN GLYCOSYLATED A1C: CPT | Mod: ZL | Performed by: FAMILY MEDICINE

## 2020-06-11 PROCEDURE — G0463 HOSPITAL OUTPT CLINIC VISIT: HCPCS

## 2020-06-11 PROCEDURE — 80053 COMPREHEN METABOLIC PANEL: CPT | Mod: ZL | Performed by: FAMILY MEDICINE

## 2020-06-11 PROCEDURE — 36415 COLL VENOUS BLD VENIPUNCTURE: CPT | Mod: ZL | Performed by: FAMILY MEDICINE

## 2020-06-11 PROCEDURE — 99213 OFFICE O/P EST LOW 20 MIN: CPT | Performed by: FAMILY MEDICINE

## 2020-06-11 ASSESSMENT — ENCOUNTER SYMPTOMS
CHILLS: 0
NERVOUS/ANXIOUS: 0
COUGH: 0
FEVER: 0

## 2020-06-11 ASSESSMENT — MIFFLIN-ST. JEOR: SCORE: 1274.42

## 2020-06-11 ASSESSMENT — PAIN SCALES - GENERAL: PAINLEVEL: NO PAIN (0)

## 2020-06-11 NOTE — NURSING NOTE
Patient presents to clinic for diabetic check up.  Previous A1C is at goal of <8  Lab Results   Component Value Date    A1C 6.5 06/11/2020    A1C 8.0 03/12/2020    A1C 7.4 12/12/2019    A1C 7.1 09/13/2019    A1C 7.4 06/13/2019     Urine microalbumin:creatine: NA  Foot exam 06/11/20  Eye exam 03/06/20    Tobacco User No  Patient is on a daily aspirin  Patient is on a Statin.  Blood pressure today of:     BP Readings from Last 1 Encounters:   06/11/20 128/78      is at the goal of <139/89 for diabetics.  Medication Reconciliation: complete    Latrice Mathias LPN

## 2020-06-11 NOTE — PROGRESS NOTES
SUBJECTIVE:   Nursing Notes:   Latrice Mathias LPN  2020  9:23 AM  Signed  Patient presents to clinic for diabetic check up.  Previous A1C is at goal of <8  Lab Results   Component Value Date    A1C 6.5 2020    A1C 8.0 2020    A1C 7.4 2019    A1C 7.1 2019    A1C 7.4 2019     Urine microalbumin:creatine: NA  Foot exam 20  Eye exam 20    Tobacco User No  Patient is on a daily aspirin  Patient is on a Statin.  Blood pressure today of:     BP Readings from Last 1 Encounters:   20 128/78      is at the goal of <139/89 for diabetics.  Medication Reconciliation: complete    Latrice Mathias LPN      Karen Vargas is a 69 year old female who presents to clinic today for a diabetic check.    Frequency of checking blood sugars: daily.  Blood sugar range:  Fastin today at home, usually 100s-110s  Lunch:  n/a  Supper:  n/a  Bedtime:  n/a  Last eye exam:  3/6/2020  Eye doctor:  [facility] - Dr. Celina Andres - no retinopathy  Lows under 70?  no  Highs above 200?  Very rarely, depending on what she has eating.  Symptoms of hypoglycemia?   non  Sores on feet?  none  Numbness/Paresthesias?  none  Excessive thirst or urination?   no  Unintended weight loss or gain?  No, 12 lb down since starting Ozempic last visit.  Taking aspirin?   yes  On statin?  Simvastatin   Any side effects of statins?  no  Taking ACEI/ARB?  Lisinopril   Smoking?  n/a  Interested in cessation?  n/a      HPI    I personally reviewed medications/allergies/history listed below:    Patient Active Problem List    Diagnosis Date Noted     Basal cell carcinoma (BCC) of skin of nose 10/08/2019     Priority: Medium     Blepharoconjunctivitis of both eyes 03/15/2019     Priority: Medium     Cataract extraction status 03/15/2019     Priority: Medium     Age-related nuclear cataract, bilateral 03/15/2019     Priority: Medium     Other specified postprocedural states 03/15/2019     Priority:  "Medium     Allergic rhinitis due to pollen 2018     Priority: Medium     Diabetes mellitus, type II (H) 2018     Priority: Medium     Overview:   Type 2 diabetes mellitus, insulin instituted , stopped agents after   gastric bypass .  Oral agents restarted  with increased weight.       Hyperlipidemia 2018     Priority: Medium     Hypertension 2018     Priority: Medium     Pain in joint, pelvic region and thigh 2011     Priority: Medium     Obesity 2010     Priority: Medium     Overview:   Height5'1  \"BMI09/07 - 38  Twqraj67/, 205Abd CircGreater than 35\"       Past Medical History:   Diagnosis Date     Basal cell carcinoma of skin of nose     2015     Colon cancer screening 2018    FIT test neg     Personal history of other medical treatment (CODE)     G2, P2-0-0-2     Personal history of other medical treatment (CODE)     History of morbid obesity, weight loss on byetta     Type 2 diabetes mellitus without complications (H)     No Comments Provided      Past Surgical History:   Procedure Laterality Date     ARTHROSCOPY KNEE           COLONOSCOPY      2006,10 year f/u     ENHANCE LASER REFRACTIVE NEW PT IN PARAMETERS      2000,LASIK refractive eye surgery      LAPAROSCOPIC BYPASS GASTRIC      Silvestre-en-Y gastric bypass with incidental cholecystectomy and umbilical hernia repair     LAPAROSCOPIC CHOLECYSTECTOMY      No Comments Provided     OTHER SURGICAL HISTORY      ,HERNIA REPAIR, umbilical hernia repair     OTHER SURGICAL HISTORY      6/15/16,QJJ446,CATARACT EXTRACTION W/  INTRAOCULAR LENS IMPLANT,Left     RELEASE CARPAL TUNNEL      2000, 2000,Laparoscopic carpal tunnel release right hand, left hand     Family History   Problem Relation Age of Onset     Cancer Father         Cancer,lung cancer ,      Heart Disease Mother         Heart Disease,Coronary artery disease,      Colon Cancer Mother         " Cancer-colon,bladder cancer, colon cancer, metastatic to liver, colon cancer     Breast Cancer Sister         Cancer-breast,metastatic breast cancer at age 63     Cancer Sister         Cancer,one  of uterine cancer age 48     Cancer Sister         Cancer,one  of bone cancer age 46.     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   - Georgi - metastatic pancreatic cancer,  2012.  Peña 72 son  Tsering 10/4/74 daughter  grandson - Barry     Current Outpatient Medications   Medication Sig Dispense Refill     alcohol swab prep pads Use to swab area of injection/jesús as directed. 100 each 6     aspirin EC 81 MG EC tablet Take 81 mg by mouth daily with food       blood glucose (ACCU-CHEK SOFTCLIX) lancing device Lancing device to be used with lancets. E11.9 NIDDM type II - Test 1 time/day. 1 each 0     blood glucose calibration (NO BRAND SPECIFIED) solution E11.9 NIDDM type II - Test 1 time/day. Accu-Chek Lenora Solution. 1 Bottle 3     blood glucose monitoring (ACCU-CHEK LENORA PLUS) meter device kit E11.9 NIDDM type II - Test 1 time/day. 1 kit 0     blood glucose monitoring (SOFTCLIX) lancets E11.9 NIDDM type II - Test 1 time/day. Accu-Chek Softclix Lancets 100 each 3     blood glucose monitoring (SOFTCLIX) lancets Use to check blood sugars once daily and as needed.  Diagnosis:  E11.9. 100 each 3     Blood  "Glucose Monitoring Suppl (FIFTY50 GLUCOSE METER 2.0) W/DEVICE KIT Accu chek Lenora Plus meter.  E11.9 NIDDM type II - Test 1 time/day.       cyanocobalamin (CYANOCOBALAMIN) 1000 MCG/ML injection INJECT 1ML INTRAMUSCULARLY EVERY 4 WEEKS. 3 mL 3     fish oil-omega-3 fatty acids 1000 MG capsule Take 1 capsule by mouth daily       glucosamine sulfate 1000 MG CAPS Take 2,000 mg by mouth daily       insulin pen needle (32G X 4 MM) 32G X 4 MM miscellaneous Use 1 pen needles weekly or as directed for ozempic administration. 100 each 3     IRON PO Take 325 mg by mouth daily with food       levothyroxine (SYNTHROID/LEVOTHROID) 25 MCG tablet Take 1 tablet (25 mcg) by mouth daily 90 tablet 3     lisinopril (PRINIVIL/ZESTRIL) 2.5 MG tablet Take 1 tablet (2.5 mg) by mouth daily 90 tablet 3     loratadine (CLARITIN) 10 MG tablet TAKE 1 TABLET BY MOUTH EVERY DAY 90 tablet 1     metFORMIN (GLUCOPHAGE) 1000 MG tablet Take 1 tablet (1,000 mg) by mouth 2 times daily (with meals) 180 tablet 3     metoprolol tartrate (LOPRESSOR) 50 MG tablet Take 0.5 tablets (25 mg) by mouth 2 times daily 90 tablet 3     Multiple Vitamin (MULTI-VITAMINS) TABS Take 1 tablet by mouth daily       pioglitazone (ACTOS) 45 MG tablet TAKE 1 TABLET (45 MG) BY MOUTH DAILY 90 tablet 1     Semaglutide,0.25 or 0.5MG/DOS, (OZEMPIC, 0.25 OR 0.5 MG/DOSE,) 2 MG/1.5ML SOPN Inject 0.25 mg Subcutaneous once a week 1.5 mL 11     simvastatin (ZOCOR) 10 MG tablet Take 1 tablet (10 mg) by mouth daily 90 tablet 2     syringe/needle, disp, (BD INTEGRA SYRINGE) 25G X 1\" 3 ML MISC 1 mL every 30 days 12 each 0     triamterene-HCTZ (MAXZIDE-25) 37.5-25 MG tablet Take 0.5 tablets by mouth daily 45 tablet 3     Allergies   Allergen Reactions     Cefaclor Rash       Review of Systems   Constitutional: Negative for chills and fever.   Respiratory: Negative for cough.    Cardiovascular: Negative for peripheral edema.   Psychiatric/Behavioral: Negative for mood changes. The patient is not " "nervous/anxious.         OBJECTIVE:     /78 (BP Location: Right arm, Patient Position: Sitting, Cuff Size: Adult Regular)   Pulse 64   Temp 97.2  F (36.2  C) (Tympanic)   Resp 18   Ht 1.549 m (5' 1\")   Wt 81.7 kg (180 lb 2 oz)   LMP  (LMP Unknown)   SpO2 99%   Breastfeeding No   BMI 34.03 kg/m    Body mass index is 34.03 kg/m .  Physical Exam  Constitutional:       Appearance: She is well-developed.   HENT:      Head: Normocephalic.   Eyes:      Pupils: Pupils are equal, round, and reactive to light.   Neck:      Musculoskeletal: Normal range of motion.   Cardiovascular:      Rate and Rhythm: Normal rate and regular rhythm.      Heart sounds: Normal heart sounds. No murmur.   Pulmonary:      Effort: Pulmonary effort is normal. No respiratory distress.      Breath sounds: Normal breath sounds. No wheezing or rales.   Skin:     General: Skin is warm.      Comments: No lesions on feet.   Neurological:      Mental Status: She is alert and oriented to person, place, and time.      Comments: Normal sensation with monofilament exam bilaterally.           PHQ-9 SCORE 6/21/2016 9/27/2016 12/27/2016   PHQ-9 Total Score 0 0 0       PHQ-2 Score:     PHQ-2 ( 1999 Pfizer) 6/11/2020 3/12/2020   Q1: Little interest or pleasure in doing things 0 0   Q2: Feeling down, depressed or hopeless 0 0   PHQ-2 Score 0 0         I personally reviewed results withpatient as listed below:   Diagnostic Test Results:  Results for orders placed or performed in visit on 06/11/20 (from the past 24 hour(s))   Comprehensive Metabolic Panel   Result Value Ref Range    Sodium 144 134 - 144 mmol/L    Potassium 3.9 3.5 - 5.1 mmol/L    Chloride 104 98 - 107 mmol/L    Carbon Dioxide 30 21 - 31 mmol/L    Anion Gap 10 3 - 14 mmol/L    Glucose 128 (H) 70 - 105 mg/dL    Urea Nitrogen 18 7 - 25 mg/dL    Creatinine 1.00 0.60 - 1.20 mg/dL    GFR Estimate 55 (L) >60 mL/min/[1.73_m2]    GFR Estimate If Black 66 >60 mL/min/[1.73_m2]    Calcium 9.8 8.6 - " 10.3 mg/dL    Bilirubin Total 0.5 0.3 - 1.0 mg/dL    Albumin 4.4 3.5 - 5.7 g/dL    Protein Total 7.3 6.4 - 8.9 g/dL    Alkaline Phosphatase 61 34 - 104 U/L    ALT 14 7 - 52 U/L    AST 15 13 - 39 U/L   Hemoglobin A1c   Result Value Ref Range    Hemoglobin A1C 6.5 (H) 4.0 - 6.0 %       ASSESSMENT/PLAN:       ICD-10-CM    1. Type 2 diabetes mellitus without complication, without long-term current use of insulin (H)  E11.9    2. Essential hypertension  I10    3. Hyperlipidemia LDL goal <100  E78.5        1.  Stable.  A1c is improved with the addition of Ozempic 0.25 mg subcutaneously weekly. Blood pressure is well controlled. Eye exam is up to date.  On aspirin daily.  Non-smoker.  On ACE-I and statin.  Follow up in 3 months.  2.  Blood pressure stable as above.    3.  Lipids stable.  On Simvastatin.      Luz Levin MD  [facility]    Portions of this dictation were created using the Dragon Nuance voice recognition system. Proofreading was completed but there may be errors in text.

## 2020-09-11 ENCOUNTER — OFFICE VISIT (OUTPATIENT)
Dept: FAMILY MEDICINE | Facility: OTHER | Age: 70
End: 2020-09-11
Attending: FAMILY MEDICINE
Payer: COMMERCIAL

## 2020-09-11 VITALS
WEIGHT: 178 LBS | BODY MASS INDEX: 33.61 KG/M2 | OXYGEN SATURATION: 99 % | TEMPERATURE: 97.1 F | HEART RATE: 58 BPM | HEIGHT: 61 IN | SYSTOLIC BLOOD PRESSURE: 118 MMHG | RESPIRATION RATE: 18 BRPM | DIASTOLIC BLOOD PRESSURE: 72 MMHG

## 2020-09-11 DIAGNOSIS — I10 ESSENTIAL HYPERTENSION: ICD-10-CM

## 2020-09-11 DIAGNOSIS — E78.5 HYPERLIPIDEMIA LDL GOAL <100: ICD-10-CM

## 2020-09-11 DIAGNOSIS — Z13.0 SCREENING FOR DEFICIENCY ANEMIA: ICD-10-CM

## 2020-09-11 DIAGNOSIS — E11.9 TYPE 2 DIABETES MELLITUS WITHOUT COMPLICATION (H): ICD-10-CM

## 2020-09-11 DIAGNOSIS — E11.9 TYPE 2 DIABETES MELLITUS WITHOUT COMPLICATION, WITHOUT LONG-TERM CURRENT USE OF INSULIN (H): ICD-10-CM

## 2020-09-11 DIAGNOSIS — E11.9 TYPE 2 DIABETES MELLITUS WITHOUT COMPLICATION, WITHOUT LONG-TERM CURRENT USE OF INSULIN (H): Primary | ICD-10-CM

## 2020-09-11 LAB
ALBUMIN SERPL-MCNC: 4.3 G/DL (ref 3.5–5.7)
ALP SERPL-CCNC: 55 U/L (ref 34–104)
ALT SERPL W P-5'-P-CCNC: 13 U/L (ref 7–52)
ANION GAP SERPL CALCULATED.3IONS-SCNC: 6 MMOL/L (ref 3–14)
AST SERPL W P-5'-P-CCNC: 16 U/L (ref 13–39)
BILIRUB SERPL-MCNC: 0.4 MG/DL (ref 0.3–1)
BUN SERPL-MCNC: 14 MG/DL (ref 7–25)
CALCIUM SERPL-MCNC: 9.6 MG/DL (ref 8.6–10.3)
CHLORIDE SERPL-SCNC: 102 MMOL/L (ref 98–107)
CHOLEST SERPL-MCNC: 179 MG/DL
CO2 SERPL-SCNC: 32 MMOL/L (ref 21–31)
CREAT SERPL-MCNC: 0.96 MG/DL (ref 0.6–1.2)
GFR SERPL CREATININE-BSD FRML MDRD: 57 ML/MIN/{1.73_M2}
GLUCOSE SERPL-MCNC: 115 MG/DL (ref 70–105)
HBA1C MFR BLD: 6.3 % (ref 4–6)
HDLC SERPL-MCNC: 78 MG/DL (ref 23–92)
LDLC SERPL CALC-MCNC: 70 MG/DL
NONHDLC SERPL-MCNC: 101 MG/DL
POTASSIUM SERPL-SCNC: 3.8 MMOL/L (ref 3.5–5.1)
PROT SERPL-MCNC: 7 G/DL (ref 6.4–8.9)
SODIUM SERPL-SCNC: 140 MMOL/L (ref 134–144)
TRIGL SERPL-MCNC: 153 MG/DL

## 2020-09-11 PROCEDURE — 36415 COLL VENOUS BLD VENIPUNCTURE: CPT | Mod: ZL | Performed by: FAMILY MEDICINE

## 2020-09-11 PROCEDURE — 83036 HEMOGLOBIN GLYCOSYLATED A1C: CPT | Mod: ZL | Performed by: FAMILY MEDICINE

## 2020-09-11 PROCEDURE — 80061 LIPID PANEL: CPT | Mod: ZL | Performed by: FAMILY MEDICINE

## 2020-09-11 PROCEDURE — G0463 HOSPITAL OUTPT CLINIC VISIT: HCPCS

## 2020-09-11 PROCEDURE — 80053 COMPREHEN METABOLIC PANEL: CPT | Mod: ZL | Performed by: FAMILY MEDICINE

## 2020-09-11 PROCEDURE — 99213 OFFICE O/P EST LOW 20 MIN: CPT | Performed by: FAMILY MEDICINE

## 2020-09-11 ASSESSMENT — MIFFLIN-ST. JEOR: SCORE: 1259.78

## 2020-09-11 ASSESSMENT — ENCOUNTER SYMPTOMS
WOUND: 0
SHORTNESS OF BREATH: 0
NUMBNESS: 0
NERVOUS/ANXIOUS: 0
FEVER: 0
PARESTHESIAS: 0
CHILLS: 0
COUGH: 0

## 2020-09-11 ASSESSMENT — PAIN SCALES - GENERAL: PAINLEVEL: NO PAIN (0)

## 2020-09-11 NOTE — NURSING NOTE
Patient presents to clinic for diabetic follow up.  Previous A1C is at goal of <8  Lab Results   Component Value Date    A1C 6.3 09/11/2020    A1C 6.5 06/11/2020    A1C 8.0 03/12/2020    A1C 7.4 12/12/2019    A1C 7.1 09/13/2019     Urine microalbumin:creatine: NA  Foot exam 03/12/20  Eye exam 03/06/20    Tobacco User No  Patient is on a daily aspirin  Patient is on a Statin.  Blood pressure today of:     BP Readings from Last 1 Encounters:   09/11/20 118/72      is at the goal of <139/89 for diabetics.  Medication Reconciliation: complete    Latrice Mathias LPN

## 2020-09-11 NOTE — PROGRESS NOTES
SUBJECTIVE:   Nursing Notes:   Latrice Mathias LPN  2020  9:18 AM  Signed  Patient presents to clinic for diabetic follow up.  Previous A1C is at goal of <8  Lab Results   Component Value Date    A1C 6.3 2020    A1C 6.5 2020    A1C 8.0 2020    A1C 7.4 2019    A1C 7.1 2019     Urine microalbumin:creatine: NA  Foot exam 20  Eye exam 20    Tobacco User No  Patient is on a daily aspirin  Patient is on a Statin.  Blood pressure today of:     BP Readings from Last 1 Encounters:   20 118/72      is at the goal of <139/89 for diabetics.  Medication Reconciliation: complete    Latrice Mathias LPN      Karen Vargas is a 70 year old female who presents to clinic today for a diabetic check.    HPI    Frequency of checking blood sugars: once a day  Blood sugar range:  Fastin today.  Usually in the 100s-110s.  Lunch:  n/a  Supper:  n/a  Bedtime:  n/a  Lows under 70?  no  Highs above 200?  no  Symptoms of hypoglycemia?   Shaky, dizzy  Sores on feet?  no  Numbness/Paresthesias?  no  Excessive thirst or urination?   no  Unintended weight loss or gain?  no  Taking aspirin?   yes  On statin?  Yes, Simvastatin   Taking ACEI/ARB?  Yes, lisinopril   Last eye exam:  3/6/2020  Eye doctor:  [facility]  Smoking?  no  Interested in cessation?  n/a      I personally reviewed medications/allergies/history listed below:    Patient Active Problem List    Diagnosis Date Noted     Basal cell carcinoma (BCC) of skin of nose 10/08/2019     Priority: Medium     Blepharoconjunctivitis of both eyes 03/15/2019     Priority: Medium     Cataract extraction status 03/15/2019     Priority: Medium     Age-related nuclear cataract, bilateral 03/15/2019     Priority: Medium     Other specified postprocedural states 03/15/2019     Priority: Medium     Allergic rhinitis due to pollen 2018     Priority: Medium     Diabetes mellitus, type II (H) 2018     Priority: Medium      "Overview:   Type 2 diabetes mellitus, insulin instituted , stopped agents after   gastric bypass .  Oral agents restarted  with increased weight.       Hyperlipidemia 2018     Priority: Medium     Hypertension 2018     Priority: Medium     Pain in joint, pelvic region and thigh 2011     Priority: Medium     Obesity 2010     Priority: Medium     Overview:   Height5'1  \"BMI09/07 - 38  Vrmyht61/, 205Abd CircGreater than 35\"       Past Medical History:   Diagnosis Date     Basal cell carcinoma of skin of nose     2015     Colon cancer screening 2018    FIT test neg     Personal history of other medical treatment (CODE)     G2, P2-0-0-2     Personal history of other medical treatment (CODE)     History of morbid obesity, weight loss on byetta     Type 2 diabetes mellitus without complications (H)     No Comments Provided      Past Surgical History:   Procedure Laterality Date     ARTHROSCOPY KNEE           COLONOSCOPY      2006,10 year f/u     ENHANCE LASER REFRACTIVE NEW PT IN PARAMETERS      2000,LASIK refractive eye surgery      LAPAROSCOPIC BYPASS GASTRIC      Silvestre-en-Y gastric bypass with incidental cholecystectomy and umbilical hernia repair     LAPAROSCOPIC CHOLECYSTECTOMY      No Comments Provided     OTHER SURGICAL HISTORY      ,HERNIA REPAIR, umbilical hernia repair     OTHER SURGICAL HISTORY      6/15/16,[id number],CATARACT EXTRACTION W/  INTRAOCULAR LENS IMPLANT,Left     RELEASE CARPAL TUNNEL      2000, 2000,Laparoscopic carpal tunnel release right hand, left hand     Family History   Problem Relation Age of Onset     Cancer Father         Cancer,lung cancer ,      Heart Disease Mother         Heart Disease,Coronary artery disease,      Colon Cancer Mother         Cancer-colon,bladder cancer, colon cancer, metastatic to liver, colon cancer     Breast Cancer Sister         Cancer-breast,metastatic breast cancer at age " 63     Cancer Sister         Cancer,one  of uterine cancer age 48     Cancer Sister         Cancer,one  of bone cancer age 46.     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   - Georgi - metastatic pancreatic cancer,  2012.  Peña 72 son  Tsreing 10/4/74 daughter  grandson - Barry     Current Outpatient Medications   Medication Sig Dispense Refill     alcohol swab prep pads Use to swab area of injection/jesús as directed. 100 each 6     aspirin EC 81 MG EC tablet Take 81 mg by mouth daily with food       blood glucose (ACCU-CHEK SOFTCLIX) lancing device Lancing device to be used with lancets. E11.9 NIDDM type II - Test 1 time/day. 1 each 0     blood glucose calibration (NO BRAND SPECIFIED) solution E11.9 NIDDM type II - Test 1 time/day. Accu-Chek Lenora Solution. 1 Bottle 3     blood glucose monitoring (ACCU-CHEK LENORA PLUS) meter device kit E11.9 NIDDM type II - Test 1 time/day. 1 kit 0     blood glucose monitoring (SOFTCLIX) lancets E11.9 NIDDM type II - Test 1 time/day. Accu-Chek Softclix Lancets 100 each 3     blood glucose monitoring (SOFTCLIX) lancets Use to check blood sugars once daily and as needed.  Diagnosis:  E11.9. 100 each 3     Blood Glucose Monitoring Suppl (FIFTY50 GLUCOSE METER 2.0) W/DEVICE KIT Accu chek Lenora Plus meter.  E11.9 NIDDM type II - Test 1 time/day.       cyanocobalamin  "(CYANOCOBALAMIN) 1000 MCG/ML injection INJECT 1ML INTRAMUSCULARLY EVERY 4 WEEKS. 3 mL 3     fish oil-omega-3 fatty acids 1000 MG capsule Take 1 capsule by mouth daily       glucosamine sulfate 1000 MG CAPS Take 2,000 mg by mouth daily       insulin pen needle (32G X 4 MM) 32G X 4 MM miscellaneous Use 1 pen needles weekly or as directed for ozempic administration. 100 each 3     IRON PO Take 325 mg by mouth daily with food       levothyroxine (SYNTHROID/LEVOTHROID) 25 MCG tablet Take 1 tablet (25 mcg) by mouth daily 90 tablet 3     lisinopril (PRINIVIL/ZESTRIL) 2.5 MG tablet Take 1 tablet (2.5 mg) by mouth daily 90 tablet 3     loratadine (CLARITIN) 10 MG tablet TAKE 1 TABLET BY MOUTH EVERY DAY 90 tablet 1     metFORMIN (GLUCOPHAGE) 1000 MG tablet Take 1 tablet (1,000 mg) by mouth 2 times daily (with meals) 180 tablet 3     metoprolol tartrate (LOPRESSOR) 50 MG tablet Take 0.5 tablets (25 mg) by mouth 2 times daily 90 tablet 3     Multiple Vitamin (MULTI-VITAMINS) TABS Take 1 tablet by mouth daily       pioglitazone (ACTOS) 45 MG tablet TAKE 1 TABLET (45 MG) BY MOUTH DAILY 90 tablet 1     Semaglutide,0.25 or 0.5MG/DOS, (OZEMPIC, 0.25 OR 0.5 MG/DOSE,) 2 MG/1.5ML SOPN Inject 0.25 mg Subcutaneous once a week 1.5 mL 11     simvastatin (ZOCOR) 10 MG tablet Take 1 tablet (10 mg) by mouth daily 90 tablet 2     syringe/needle, disp, (BD INTEGRA SYRINGE) 25G X 1\" 3 ML MISC 1 mL every 30 days 12 each 0     triamterene-HCTZ (MAXZIDE-25) 37.5-25 MG tablet Take 0.5 tablets by mouth daily 45 tablet 3     Allergies   Allergen Reactions     Cefaclor Rash       Review of Systems   Constitutional: Negative for chills and fever.   Respiratory: Negative for cough and shortness of breath.    Cardiovascular: Negative for peripheral edema.   Skin: Negative for wound.   Neurological: Negative for numbness and paresthesias.   Psychiatric/Behavioral: Negative for mood changes. The patient is not nervous/anxious.         OBJECTIVE:     BP " "118/72 (BP Location: Right arm, Patient Position: Right side, Cuff Size: Adult Large)   Pulse 58   Temp 97.1  F (36.2  C)   Resp 18   Ht 1.549 m (5' 1\")   Wt 80.7 kg (178 lb)   LMP  (LMP Unknown)   SpO2 99%   Breastfeeding No   BMI 33.63 kg/m    Body mass index is 33.63 kg/m .  Physical Exam  Constitutional:       Appearance: She is well-developed.   HENT:      Head: Normocephalic.   Eyes:      Pupils: Pupils are equal, round, and reactive to light.   Neck:      Musculoskeletal: Normal range of motion.   Cardiovascular:      Rate and Rhythm: Normal rate and regular rhythm.      Heart sounds: Normal heart sounds. No murmur.   Pulmonary:      Effort: Pulmonary effort is normal. No respiratory distress.      Breath sounds: Normal breath sounds. No wheezing or rales.   Skin:     General: Skin is warm.      Comments: No lesions on feet.   Neurological:      Mental Status: She is alert and oriented to person, place, and time.      Comments: Normal sensation with monofilament exam bilaterally.           PHQ-9 SCORE 6/21/2016 9/27/2016 12/27/2016   PHQ-9 Total Score 0 0 0       PHQ-2 Score:     PHQ-2 ( 1999 Pfizer) 9/11/2020 6/11/2020   Q1: Little interest or pleasure in doing things 0 0   Q2: Feeling down, depressed or hopeless 0 0   PHQ-2 Score 0 0       I personally reviewed results withpatient as listed below:   Diagnostic Test Results:  Results for orders placed or performed in visit on 09/11/20 (from the past 24 hour(s))   Lipid panel   Result Value Ref Range    Cholesterol 179 <200 mg/dL    Triglycerides 153 (H) <150 mg/dL    HDL Cholesterol 78 23 - 92 mg/dL    LDL Cholesterol Calculated 70 <100 mg/dL    Non HDL Cholesterol 101 <130 mg/dL   Comprehensive Metabolic Panel   Result Value Ref Range    Sodium 140 134 - 144 mmol/L    Potassium 3.8 3.5 - 5.1 mmol/L    Chloride 102 98 - 107 mmol/L    Carbon Dioxide 32 (H) 21 - 31 mmol/L    Anion Gap 6 3 - 14 mmol/L    Glucose 115 (H) 70 - 105 mg/dL    Urea Nitrogen " 14 7 - 25 mg/dL    Creatinine 0.96 0.60 - 1.20 mg/dL    GFR Estimate 57 (L) >60 mL/min/[1.73_m2]    GFR Estimate If Black 69 >60 mL/min/[1.73_m2]    Calcium 9.6 8.6 - 10.3 mg/dL    Bilirubin Total 0.4 0.3 - 1.0 mg/dL    Albumin 4.3 3.5 - 5.7 g/dL    Protein Total 7.0 6.4 - 8.9 g/dL    Alkaline Phosphatase 55 34 - 104 U/L    ALT 13 7 - 52 U/L    AST 16 13 - 39 U/L   Hemoglobin A1c   Result Value Ref Range    Hemoglobin A1C 6.3 (H) 4.0 - 6.0 %       ASSESSMENT/PLAN:       ICD-10-CM    1. Type 2 diabetes mellitus without complication, without long-term current use of insulin (H)  E11.9    2. Essential hypertension  I10    3. Hyperlipidemia LDL goal <100  E78.5        1. Stable.  A1c in good range.  Blood pressure is well controlled. Eye exam is up to date.  On aspirin daily.  Non-smoker.  On ACE-I (lisinopril) and statin (Simvastatin).  Follow up in 6 months.  2.   Hypertension well controlled.  Continue current medications.  3.   Hyperlipidemia well controlled.  Continue on current medications.      Luz Levin MD  [facility] [place]    Portions of this dictation were created using the Dragon Nuance voice recognition system. Proofreading was completed but there may be errors in text.

## 2020-12-27 ENCOUNTER — HEALTH MAINTENANCE LETTER (OUTPATIENT)
Age: 70
End: 2020-12-27

## 2021-02-02 DIAGNOSIS — E11.9 TYPE 2 DIABETES MELLITUS WITHOUT COMPLICATION, WITHOUT LONG-TERM CURRENT USE OF INSULIN (H): ICD-10-CM

## 2021-02-02 RX ORDER — PIOGLITAZONEHYDROCHLORIDE 45 MG/1
TABLET ORAL
Qty: 90 TABLET | Refills: 0 | Status: SHIPPED | OUTPATIENT
Start: 2021-02-02

## 2021-02-02 NOTE — TELEPHONE ENCOUNTER
[facility] sent Rx request for the following:   pioglitazone (ACTOS) 45 MG tablet   Sig:TAKE 1 TABLET EVERY DAY    Last Prescription Date:   06/03/2020  Last Fill Qty/Refills:         90, R-1    Last Office Visit:              09/11/2020 (Ollie)   Future Office visit:           None noted   Thiazolidinedione Agents (TZDs)  Passed - 2/2/2021 11:51 AM     Patient due for A1C recheck 03/11/2021. Prescription approved per [facility] Refill Protocol.  Elsa Tuttle RN ....................  2/2/2021   12:14 PM

## 2021-02-03 DIAGNOSIS — E03.9 HYPOTHYROIDISM, UNSPECIFIED TYPE: ICD-10-CM

## 2021-02-03 RX ORDER — LEVOTHYROXINE SODIUM 25 UG/1
25 TABLET ORAL DAILY
Qty: 90 TABLET | Refills: 3 | OUTPATIENT
Start: 2021-02-03

## 2021-02-03 NOTE — TELEPHONE ENCOUNTER
Redundant refill request refused: Too soon:    levothyroxine (SYNTHROID/LEVOTHROID) 25 MCG tablet 90 tablet 3 4/17/2020  No   Sig - Route: Take 1 tablet (25 mcg) by mouth daily - Oral   Sent to pharmacy as: levothyroxine (SYNTHROID/LEVOTHROID) 25 MCG tablet   Class: E-Prescribe   Order: [id number]   E-Prescribing Status: Receipt confirmed by pharmacy (4/17/2020  8:53 AM CDT)     [facility] - [address] [place]     Unable to complete prescription refill per RN Medication Refill Policy. Latrice Argueta RN .............. 2/3/2021  9:06 AM

## 2021-02-09 ENCOUNTER — TELEPHONE (OUTPATIENT)
Dept: FAMILY MEDICINE | Facility: OTHER | Age: 71
End: 2021-02-09

## 2021-02-09 NOTE — TELEPHONE ENCOUNTER
Would like to discuss a new prescription for levothyroxine.      Dwight Phelps on 2/9/2021 at 11:49 AM

## 2021-02-09 NOTE — TELEPHONE ENCOUNTER
Patient had requested refill of Levothyroxine too soon as she still has a refill left.  Luz Levin MD was gone the day request came.  Carmen Salazar MD approved the refill of Levothyroxine.  [facility] pharmacy inquiring about why refill was requested and why a different provider signed for order.  No further assistance needed by [facility].  Patient will be due for medication refills in April 2021.    Latrice Mathias LPN............2/9/2021 1:57 PM

## 2021-02-12 DIAGNOSIS — E03.9 HYPOTHYROIDISM, UNSPECIFIED TYPE: ICD-10-CM

## 2021-02-12 RX ORDER — LEVOTHYROXINE SODIUM 25 UG/1
25 TABLET ORAL DAILY
Qty: 90 TABLET | Refills: 3 | Status: SHIPPED | OUTPATIENT
Start: 2021-02-12

## 2021-04-24 ENCOUNTER — HEALTH MAINTENANCE LETTER (OUTPATIENT)
Age: 71
End: 2021-04-24

## 2021-05-26 ENCOUNTER — TRANSFERRED RECORDS (OUTPATIENT)
Dept: HEALTH INFORMATION MANAGEMENT | Facility: OTHER | Age: 71
End: 2021-05-26

## 2021-05-26 LAB — HEMOCCULT STL QL IA: NEGATIVE

## 2021-06-01 ENCOUNTER — TRANSFERRED RECORDS (OUTPATIENT)
Dept: HEALTH INFORMATION MANAGEMENT | Facility: OTHER | Age: 71
End: 2021-06-01

## 2021-06-03 DIAGNOSIS — E11.9 TYPE 2 DIABETES MELLITUS WITHOUT COMPLICATION, WITHOUT LONG-TERM CURRENT USE OF INSULIN (H): ICD-10-CM

## 2021-06-03 RX ORDER — PIOGLITAZONEHYDROCHLORIDE 45 MG/1
TABLET ORAL
Qty: 90 TABLET | Refills: 0 | OUTPATIENT
Start: 2021-06-03

## 2021-06-03 NOTE — TELEPHONE ENCOUNTER
[facility] sent Rx request for the following:     Requested Prescriptions   Pending Prescriptions Disp Refills     pioglitazone (ACTOS) 45 MG tablet [Pharmacy Med Name: PIOGLITAZONE HCL 45 MG Tablet] 90 tablet 0     Sig: TAKE 1 TABLET EVERY DAY     No longer under provider care. Est with:  [facility]   [facility]  [facility]     Unable to complete prescription refill per RN Medication Refill Policy.................... Louise Rossi RN ....................  6/3/2021   3:54 PM

## 2021-06-20 DIAGNOSIS — E11.9 TYPE 2 DIABETES MELLITUS WITHOUT COMPLICATION, WITHOUT LONG-TERM CURRENT USE OF INSULIN (H): ICD-10-CM

## 2021-06-22 RX ORDER — BLOOD SUGAR DIAGNOSTIC
STRIP MISCELLANEOUS
Qty: 100 STRIP | Refills: 3 | Status: SHIPPED | OUTPATIENT
Start: 2021-06-22

## 2021-07-05 ENCOUNTER — PATIENT OUTREACH (OUTPATIENT)
Dept: FAMILY MEDICINE | Facility: OTHER | Age: 71
End: 2021-07-05

## 2021-07-05 NOTE — LETTER
July 5, 2021      Karen Vargas  [id number]  [facility] [id number]      Your healthcare team cares about your health. To provide you with the best care,   we have reviewed your chart and based on our findings, we see that you are due to:     - DIABETES FOLLOW UP: Schedule a diabetic follow up appointment as a Office Visit. Patients with diabetes should generally see their provider every 3-6 months.    Schedule a DIABETIC EYE EXAM.  This exam is done with an optometrist. You can schedule this appointment with your eye doctor.  If you need a referral, please let us know.    If you have already completed these items, please contact the clinic via phone or   [facility] so your care team can review and update your records. Thank you for   choosing [facility] for your healthcare needs. For any questions,   concerns, or to schedule an appointment please contact the clinic.       Healthy Regards,      Your [facility] Care Team          Enclosure: N/A

## 2021-07-05 NOTE — TELEPHONE ENCOUNTER
Patient Quality Outreach      Summary:    Patient has the following on her problem list/HM:   Diabetes    Last A1C:  Lab Results   Component Value Date    A1C 6.3 2020    A1C 6.5 2020       Last LDL:    Lab Results   Component Value Date    LDL 70 2020       Is the patient on a Statin? Yes          Is the patient on Aspirin? Yes    Medications     HMG CoA Reductase Inhibitors     simvastatin (ZOCOR) 10 MG tablet       Salicylates     aspirin EC 81 MG EC tablet             Last three blood pressure readings:  BP Readings from Last 3 Encounters:   20 118/72   20 128/78   20 124/80            Tobacco Use      Smoking status: Former Smoker        Packs/day: 2.00        Years: 10.00        Pack years: 20        Types: Cigarettes        Quit date: 1980        Years since quittin.5      Smokeless tobacco: Never Used          Patient is due/failing the following:   Eye Exam    Type of outreach:    Sent letter.    Questions for provider review:    None                                                                                                                                     Carolina Pierre LPN ....................2021  1:50 PM       Chart routed to n/a.

## 2021-07-06 DIAGNOSIS — E78.5 HYPERLIPIDEMIA LDL GOAL <100: ICD-10-CM

## 2021-07-06 DIAGNOSIS — E11.9 TYPE 2 DIABETES MELLITUS WITHOUT COMPLICATION, WITHOUT LONG-TERM CURRENT USE OF INSULIN (H): ICD-10-CM

## 2021-07-07 RX ORDER — SIMVASTATIN 10 MG
TABLET ORAL
Qty: 90 TABLET | Refills: 0 | Status: SHIPPED | OUTPATIENT
Start: 2021-07-07

## 2021-07-07 RX ORDER — LISINOPRIL 2.5 MG/1
TABLET ORAL
Qty: 90 TABLET | Refills: 0 | Status: SHIPPED | OUTPATIENT
Start: 2021-07-07

## 2021-07-07 NOTE — TELEPHONE ENCOUNTER
"Requested Prescriptions   Pending Prescriptions Disp Refills     lisinopril (ZESTRIL) 2.5 MG tablet [Pharmacy Med Name: LISINOPRIL 2.5 MG Tablet] 90 tablet 3     Sig: TAKE 1 TABLET EVERY DAY       ACE Inhibitors (Including Combos) Protocol Passed - 7/6/2021  4:10 PM        Passed - Blood pressure under 140/90 in past 12 months     BP Readings from Last 3 Encounters:   09/11/20 118/72   06/11/20 128/78   03/12/20 124/80                 Passed - Recent (12 mo) or future (30 days) visit within the authorizing provider's specialty     Patient has had an office visit with the authorizing provider or a provider within the authorizing providers department within the previous 12 mos or has a future within next 30 days. See \"Patient Info\" tab in inbasket, or \"Choose Columns\" in Meds & Orders section of the refill encounter.              Passed - Medication is active on med list        Passed - Patient is age 18 or older        Passed - No active pregnancy on record        Passed - Normal serum creatinine on file in past 12 months     Recent Labs   Lab Test 09/11/20  0820   CR 0.96       Ok to refill medication if creatinine is low          Passed - Normal serum potassium on file in past 12 months     Recent Labs   Lab Test 09/11/20  0820   POTASSIUM 3.8             Passed - No positive pregnancy test within past 12 months         LOV 9/11/20 Sharan Fernandez  Prescription approved per [facility] Refill Protocol.  Brenda J. Goodell, RN on 7/7/2021 at 11:28 AM    "

## 2021-07-07 NOTE — TELEPHONE ENCOUNTER
"Requested Prescriptions   Pending Prescriptions Disp Refills     simvastatin (ZOCOR) 10 MG tablet [Pharmacy Med Name: SIMVASTATIN 10 MG Tablet] 90 tablet 2     Sig: TAKE 1 TABLET EVERY DAY       Statins Protocol Passed - 7/6/2021  4:10 PM        Passed - LDL on file in past 12 months     Recent Labs   Lab Test 09/11/20  0820   LDL 70             Passed - No abnormal creatine kinase in past 12 months     No lab results found.             Passed - Recent (12 mo) or future (30 days) visit within the authorizing provider's specialty     Patient has had an office visit with the authorizing provider or a provider within the authorizing providers department within the previous 12 mos or has a future within next 30 days. See \"Patient Info\" tab in inbasket, or \"Choose Columns\" in Meds & Orders section of the refill encounter.              Passed - Medication is active on med list        Passed - Patient is age 18 or older        Passed - No active pregnancy on record        Passed - No positive pregnancy test in past 12 months         LOV 9/11/20 Sharan Fernandez  Prescription approved per [facility] Refill Protocol.  Brenda J. Goodell, RN on 7/7/2021 at 11:32 AM      "

## 2021-08-25 NOTE — NURSING NOTE
"Chief Complaint   Patient presents with     Derm Problem     lesion on nose       Initial /80 (BP Location: Left arm, Patient Position: Sitting, Cuff Size: Adult Large)   Pulse 74   Temp 97.5  F (36.4  C) (Tympanic)   Resp 18   Wt 84.7 kg (186 lb 12.8 oz)   Breastfeeding? No   BMI 35.30 kg/m   Estimated body mass index is 35.3 kg/m  as calculated from the following:    Height as of 9/13/19: 1.549 m (5' 1\").    Weight as of this encounter: 84.7 kg (186 lb 12.8 oz).  Medication Reconciliation: complete    Lida Szymanski LPN  "
Quality 431: Preventive Care And Screening: Unhealthy Alcohol Use - Screening: Patient screened for unhealthy alcohol use using a single question and scores less than 2 times per year
Quality 110: Preventive Care And Screening: Influenza Immunization: Influenza immunization was not ordered or administered, reason not given
Quality 226: Preventive Care And Screening: Tobacco Use: Screening And Cessation Intervention: Patient screened for tobacco use and is an ex/non-smoker
Detail Level: Detailed
Quality 130: Documentation Of Current Medications In The Medical Record: Current Medications Documented

## 2021-10-09 ENCOUNTER — HEALTH MAINTENANCE LETTER (OUTPATIENT)
Age: 71
End: 2021-10-09

## 2021-11-09 DIAGNOSIS — E11.9 TYPE 2 DIABETES MELLITUS WITHOUT COMPLICATION, WITHOUT LONG-TERM CURRENT USE OF INSULIN (H): ICD-10-CM

## 2021-11-09 RX ORDER — LISINOPRIL 2.5 MG/1
TABLET ORAL
Qty: 90 TABLET | Refills: 0 | OUTPATIENT
Start: 2021-11-09

## 2021-11-09 NOTE — TELEPHONE ENCOUNTER
"[facility] - [place], [place]  Pharmacy sent Rx request for the following:      Requested Prescriptions   Pending Prescriptions Disp Refills     lisinopril (ZESTRIL) 2.5 MG tablet [Pharmacy Med Name: LISINOPRIL 2.5 MG Tablet] 90 tablet 0     Sig: TAKE 1 TABLET EVERY DAY (NEED MD APPOINTMENT FOR REFILLS)       ACE Inhibitors (Including Combos) Protocol Failed - 11/9/2021  2:29 AM        Failed - Blood pressure under 140/90 in past 12 months     BP Readings from Last 3 Encounters:   09/11/20 118/72   06/11/20 128/78   03/12/20 124/80           Failed - Recent (12 mo) or future (30 days) visit within the authorizing provider's specialty     Patient has had an office visit with the authorizing provider or a provider within the authorizing providers department within the previous 12 mos or has a future within next 30 days. See \"Patient Info\" tab in inbasket, or \"Choose Columns\" in Meds & Orders section of the refill encounter.          Failed - Normal serum creatinine on file in past 12 months     Recent Labs   Lab Test 09/11/20  0820   CR 0.96   Ok to refill medication if creatinine is low          Failed - Normal serum potassium on file in past 12 months     Recent Labs   Lab Test 09/11/20  0820   POTASSIUM 3.8           Passed - Medication is active on med list        Passed - Patient is age 18 or older        Passed - No active pregnancy on record        Passed - No positive pregnancy test within past 12 months         Last Prescription Date:   7/7/2021  Last Fill Qty/Refills:         90, R-0    Last Office Visit:              9/20/2021   Future Office visit:           None noted   Medication is being denied due to: Per notes patient has established care with Ely Cadena. Colleen Fam RN ....................  11/9/2021   9:02 AM               "

## 2022-05-16 ENCOUNTER — HEALTH MAINTENANCE LETTER (OUTPATIENT)
Age: 72
End: 2022-05-16

## 2022-09-11 ENCOUNTER — HEALTH MAINTENANCE LETTER (OUTPATIENT)
Age: 72
End: 2022-09-11

## 2023-01-23 ENCOUNTER — HEALTH MAINTENANCE LETTER (OUTPATIENT)
Age: 73
End: 2023-01-23

## 2023-10-07 ENCOUNTER — HEALTH MAINTENANCE LETTER (OUTPATIENT)
Age: 73
End: 2023-10-07

## 2023-11-09 DIAGNOSIS — Z12.11 COLON CANCER SCREENING: ICD-10-CM

## 2023-11-23 ENCOUNTER — LAB (OUTPATIENT)
Dept: FAMILY MEDICINE | Facility: CLINIC | Age: 73
End: 2023-11-23
Payer: COMMERCIAL

## 2023-11-23 DIAGNOSIS — Z12.11 COLON CANCER SCREENING: ICD-10-CM

## 2023-12-26 LAB — NONINV COLON CA DNA+OCC BLD SCRN STL QL: NEGATIVE

## 2024-05-04 ENCOUNTER — HEALTH MAINTENANCE LETTER (OUTPATIENT)
Age: 74
End: 2024-05-04

## 2024-06-04 NOTE — TELEPHONE ENCOUNTER
Abdomen , soft, nontender, nondistended , no guarding or rigidity , no masses palpable , normal bowel sounds , Liver and Spleen , no hepatomegaly present , no hepatosplenomegaly , liver nontender , spleen not palpable Called patient.  She has been on victoza and byetta in the past, which both worked well but were extremely expensive.  Glipizide in the past gave her very low blood sugars.  Has not been on actos in the past.  Will try 15 mg daily.  Prescription sent to [facility].  Luz Levin

## 2024-07-20 NOTE — PROGRESS NOTES
"  SUBJECTIVE:   Nursing Notes:   Carolina Pierre LPN  12/12/2019  8:48 AM  Sign at exiting of workspace  Chief Complaint   Patient presents with     Diabetes     Previous A1C is at goal of <8  Lab Results   Component Value Date    A1C 7.4 12/12/2019    A1C 7.1 09/13/2019    A1C 7.4 06/13/2019    A1C 8.5 03/15/2019    A1C 7.3 12/17/2018     Urine microalbumin:creatine: 0  Foot exam 09/13/19  Eye exam 01/11/19    Tobacco User no  Patient is on a daily aspirin  Patient is on a Statin.  Blood pressure today of:     BP Readings from Last 1 Encounters:   12/12/19 122/70      is at the goal of <139/89 for diabetics.    Carolina Pierre LPN on 12/12/2019 at 8:48 AM      Initial /70 (BP Location: Right arm, Patient Position: Sitting, Cuff Size: Adult Large)   Pulse 64   Temp 96.8  F (36  C) (Tympanic)   Resp 18   Ht 1.549 m (5' 1\")   Wt 86.2 kg (190 lb)   SpO2 98%   BMI 35.90 kg/m    Estimated body mass index is 35.9 kg/m  as calculated from the following:    Height as of this encounter: 1.549 m (5' 1\").    Weight as of this encounter: 86.2 kg (190 lb).  Medication Reconciliation: complete    Carolina Pierre LPN    Karen Vargas is a 69 year old female who presents to clinic today for a diabetes check      Diabetes:   She presents for follow up of diabetes.  She is checking home blood glucose a few times a month. She checks blood glucose before meals.  Blood glucose is never over 200 and never under 70. She is aware of hypoglycemia symptoms including shakiness, dizziness, weakness, lethargy and blurred vision. She has no concerns regarding her diabetes at this time.  She is not experiencing numbness or burning in feet, excessive thirst, blurry vision, weight changes or redness, sores or blisters on feet. The patient has had a diabetic eye exam in the last 12 months. Eye exam performed on 1/11/19.    Diabetes Management Resources      I personally reviewed medications/allergies/history listed below:  " Patient presented with elevated serum creatinine on admission.  Received IV fluids.  Resolved upon discharge   "  Patient Active Problem List    Diagnosis Date Noted     Basal cell carcinoma (BCC) of skin of nose 10/08/2019     Priority: Medium     Blepharoconjunctivitis of both eyes 03/15/2019     Priority: Medium     Cataract extraction status 03/15/2019     Priority: Medium     Age-related nuclear cataract, bilateral 03/15/2019     Priority: Medium     Other specified postprocedural states 03/15/2019     Priority: Medium     Allergic rhinitis due to pollen 01/25/2018     Priority: Medium     Diabetes mellitus, type II (H) 01/25/2018     Priority: Medium     Overview:   Type 2 diabetes mellitus, insulin instituted 12/99, stopped agents after   gastric bypass 1/02.  Oral agents restarted 6/04 with increased weight.       Hyperlipidemia 01/25/2018     Priority: Medium     Hypertension 01/25/2018     Priority: Medium     Pain in joint, pelvic region and thigh 01/12/2011     Priority: Medium     Obesity 03/16/2010     Priority: Medium     Overview:   Height5'1  \"BMI09/07 - 38  Xpbmax97/22/09, 205Abd CircGreater than 35\"       Past Medical History:   Diagnosis Date     Basal cell carcinoma of skin of nose     2/25/2015     Colon cancer screening 12/28/2018    FIT test neg     Personal history of other medical treatment (CODE)     G2, P2-0-0-2     Personal history of other medical treatment (CODE)     History of morbid obesity, weight loss on byetta     Type 2 diabetes mellitus without complications (H)     No Comments Provided      Past Surgical History:   Procedure Laterality Date     ARTHROSCOPY KNEE      2008     COLONOSCOPY      12/6/2006,10 year f/u     ENHANCE LASER REFRACTIVE NEW PT IN PARAMETERS      07/2000,LASIK refractive eye surgery 7/00     LAPAROSCOPIC BYPASS GASTRIC      Silvestre-en-Y gastric bypass with incidental cholecystectomy and umbilical hernia repair     LAPAROSCOPIC CHOLECYSTECTOMY      No Comments Provided     OTHER SURGICAL HISTORY      ,HERNIA REPAIR, umbilical hernia repair     OTHER SURGICAL HISTORY "      6/15/16,[id number],CATARACT EXTRACTION W/  INTRAOCULAR LENS IMPLANT,Left     RELEASE CARPAL TUNNEL      2000, 2000,Laparoscopic carpal tunnel release right hand, left hand     Family History   Problem Relation Age of Onset     Cancer Father         Cancer,lung cancer ,      Heart Disease Mother         Heart Disease,Coronary artery disease,      Colon Cancer Mother         Cancer-colon,bladder cancer, colon cancer, metastatic to liver, colon cancer     Breast Cancer Sister         Cancer-breast,metastatic breast cancer at age 63     Cancer Sister         Cancer,one  of uterine cancer age 48     Cancer Sister         Cancer,one  of bone cancer age 46.     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  Retired from [employer] spring of 2005.   - Georgi - metastatic pancreatic cancer,  2012.  Peña 72 son  Tsering 10/4/74 daughter  grandson - Barry     Current Outpatient Medications   Medication Sig Dispense Refill     aspirin EC 81 MG EC tablet Take 81 mg by mouth daily with food       blood glucose (NO BRAND SPECIFIED) test strip Use to test blood sugar one time daily or as directed. 100 each 3     blood glucose monitoring (SOFTCLIX) lancets Use to check blood sugars once daily and as needed.  Diagnosis:  E11.9. 100 each 3     Blood Glucose Monitoring Suppl (FIFTY50 GLUCOSE METER 2.0) W/DEVICE KIT Accu chek Lenora Plus meter.   "E11.9 NIDDM type II - Test 1 time/day.       fish oil-omega-3 fatty acids 1000 MG capsule Take 1 capsule by mouth daily       glucosamine sulfate 1000 MG CAPS Take 2,000 mg by mouth daily       IRON PO Take 325 mg by mouth daily with food       lisinopril (PRINIVIL/ZESTRIL) 2.5 MG tablet Take 1 tablet (2.5 mg) by mouth daily 90 tablet 3     loratadine (CLARITIN) 10 MG tablet TAKE 1 TABLET BY MOUTH EVERY DAY 90 tablet 1     metFORMIN (GLUCOPHAGE) 1000 MG tablet Take 1 tablet (1,000 mg) by mouth 2 times daily (with meals) 180 tablet 3     metoprolol tartrate (LOPRESSOR) 50 MG tablet Take 0.5 tablets (25 mg) by mouth 2 times daily 90 tablet 3     Multiple Vitamin (MULTI-VITAMINS) TABS Take 1 tablet by mouth daily       pioglitazone (ACTOS) 45 MG tablet Take 1 tablet (45 mg) by mouth daily 90 tablet 3     simvastatin (ZOCOR) 10 MG tablet TAKE 1 TABLET BY MOUTH EVERY DAY 90 tablet 2     triamterene-HCTZ (MAXZIDE-25) 37.5-25 MG tablet Take 0.5 tablets by mouth daily 45 tablet 3     vitamin B-12 (CYANOCOBALAMIN) 1000 MCG/ML injection INJECT 1ML INTRAMUSCULARLY EVERY 4 WEEKS. 3 mL 3     Allergies   Allergen Reactions     Cefaclor Rash       Review of Systems   Constitutional: Negative for chills and fever.   Respiratory: Negative for cough and shortness of breath.    Psychiatric/Behavioral: Negative for mood changes. The patient is not nervous/anxious.         OBJECTIVE:     /70 (BP Location: Right arm, Patient Position: Sitting, Cuff Size: Adult Large)   Pulse 64   Temp 96.8  F (36  C) (Tympanic)   Resp 18   Ht 1.549 m (5' 1\")   Wt 86.2 kg (190 lb)   SpO2 98%   BMI 35.90 kg/m    Body mass index is 35.9 kg/m .      PHQ-2 Score:     PHQ-2 ( 1999 Pfizer) 12/12/2019 9/13/2019   Q1: Little interest or pleasure in doing things 0 0   Q2: Feeling down, depressed or hopeless 0 0   PHQ-2 Score 0 0       I personally reviewed results withpatient as listed below:   Diagnostic Test Results:  Lab Results   Component Value " Date    A1C 7.4 12/12/2019    A1C 7.1 09/13/2019    A1C 7.4 06/13/2019    A1C 8.5 03/15/2019    A1C 7.3 12/17/2018     Recent Labs   Lab Test 09/13/19  0753 03/15/19  0747   CHOL 191 200*   HDL 76 83   LDL 85 88   TRIG 150* 146     Last Comprehensive Metabolic Panel:  Sodium   Date Value Ref Range Status   12/12/2019 140 134 - 144 mmol/L Final     Potassium   Date Value Ref Range Status   12/12/2019 3.8 3.5 - 5.1 mmol/L Final     Chloride   Date Value Ref Range Status   12/12/2019 103 98 - 107 mmol/L Final     Carbon Dioxide   Date Value Ref Range Status   12/12/2019 26 21 - 31 mmol/L Final     Anion Gap   Date Value Ref Range Status   12/12/2019 11 3 - 14 mmol/L Final     Glucose   Date Value Ref Range Status   12/12/2019 143 (H) 70 - 105 mg/dL Final     Urea Nitrogen   Date Value Ref Range Status   12/12/2019 20 7 - 25 mg/dL Final     Creatinine   Date Value Ref Range Status   12/12/2019 0.98 0.60 - 1.20 mg/dL Final     GFR Estimate   Date Value Ref Range Status   12/12/2019 56 (L) >60 mL/min/[1.73_m2] Final     Calcium   Date Value Ref Range Status   12/12/2019 9.5 8.6 - 10.3 mg/dL Final     Liver Function Studies -   Recent Labs   Lab Test 12/12/19  0807   PROTTOTAL 7.1   ALBUMIN 4.5   BILITOTAL 0.4   ALKPHOS 55   AST 17   ALT 14         ASSESSMENT/PLAN:       ICD-10-CM    1. Type 2 diabetes mellitus without complication, without long-term current use of insulin (H) E11.9    2. Essential hypertension I10    3. Hyperlipidemia LDL goal <100 E78.5        1.   Stable.  A1c is still a little high, but improving with lifestyle changes.  She feels that she wants to continue working on diet/exercise instead of adding any further medications.  Blood pressure is well controlled. Eye exam is up to date.  On aspirin daily.  Non-smoker.  On ACE-I and statin.  Follow up in 3 months.  2.  Blood pressure stable as above.  Medications refilled as above.  3.  Lipids stable.  On Simvastatin.    Luz Levin MD  [facility]    Portions of this dictation were created using the Dragon Nuance voice recognition system. Proofreading was completed but there may be errors in text.